# Patient Record
Sex: MALE | NOT HISPANIC OR LATINO | Employment: OTHER | URBAN - METROPOLITAN AREA
[De-identification: names, ages, dates, MRNs, and addresses within clinical notes are randomized per-mention and may not be internally consistent; named-entity substitution may affect disease eponyms.]

---

## 2017-01-01 ENCOUNTER — TELEPHONE (OUTPATIENT)
Dept: INTERNAL MEDICINE | Facility: CLINIC | Age: 77
End: 2017-01-01

## 2017-01-01 ENCOUNTER — OFFICE VISIT (OUTPATIENT)
Dept: INTERNAL MEDICINE | Facility: CLINIC | Age: 77
End: 2017-01-01

## 2017-01-01 VITALS
BODY MASS INDEX: 38.02 KG/M2 | DIASTOLIC BLOOD PRESSURE: 80 MMHG | TEMPERATURE: 98.1 F | HEART RATE: 62 BPM | WEIGHT: 265 LBS | RESPIRATION RATE: 18 BRPM | SYSTOLIC BLOOD PRESSURE: 130 MMHG

## 2017-01-01 VITALS
SYSTOLIC BLOOD PRESSURE: 138 MMHG | HEART RATE: 84 BPM | WEIGHT: 260 LBS | RESPIRATION RATE: 20 BRPM | BODY MASS INDEX: 37.31 KG/M2 | TEMPERATURE: 97.5 F | DIASTOLIC BLOOD PRESSURE: 78 MMHG

## 2017-01-01 DIAGNOSIS — I25.10 CORONARY ARTERY DISEASE INVOLVING NATIVE CORONARY ARTERY OF NATIVE HEART WITHOUT ANGINA PECTORIS: ICD-10-CM

## 2017-01-01 DIAGNOSIS — G47.00 INSOMNIA, UNSPECIFIED TYPE: ICD-10-CM

## 2017-01-01 DIAGNOSIS — E78.5 DYSLIPIDEMIA: ICD-10-CM

## 2017-01-01 DIAGNOSIS — N18.9 CKD (CHRONIC KIDNEY DISEASE), UNSPECIFIED STAGE: Primary | ICD-10-CM

## 2017-01-01 DIAGNOSIS — Z95.1 HX OF CABG: ICD-10-CM

## 2017-01-01 DIAGNOSIS — E11.9 TYPE 2 DIABETES MELLITUS WITHOUT COMPLICATION, WITH LONG-TERM CURRENT USE OF INSULIN (HCC): ICD-10-CM

## 2017-01-01 DIAGNOSIS — K21.9 GASTROESOPHAGEAL REFLUX DISEASE, ESOPHAGITIS PRESENCE NOT SPECIFIED: ICD-10-CM

## 2017-01-01 DIAGNOSIS — I10 ESSENTIAL HYPERTENSION: ICD-10-CM

## 2017-01-01 DIAGNOSIS — E55.9 VITAMIN D DEFICIENCY DISEASE: ICD-10-CM

## 2017-01-01 DIAGNOSIS — N18.9 CHRONIC KIDNEY DISEASE, UNSPECIFIED CKD STAGE: ICD-10-CM

## 2017-01-01 DIAGNOSIS — Z79.4 TYPE 2 DIABETES MELLITUS WITHOUT COMPLICATION, WITH LONG-TERM CURRENT USE OF INSULIN (HCC): ICD-10-CM

## 2017-01-01 DIAGNOSIS — R60.0 LOCALIZED EDEMA: ICD-10-CM

## 2017-01-01 DIAGNOSIS — I25.10 CORONARY ARTERY DISEASE INVOLVING NATIVE CORONARY ARTERY OF NATIVE HEART WITHOUT ANGINA PECTORIS: Primary | ICD-10-CM

## 2017-01-01 LAB
A/C: NORMAL
A/C: NORMAL
ALBUMIN SERPL-MCNC: 4.1 G/DL (ref 3.2–4.8)
ALBUMIN SERPL-MCNC: 4.4 G/DL (ref 3.2–4.8)
ALBUMIN/GLOB SERPL: 1.4 G/DL (ref 1.5–2.5)
ALBUMIN/GLOB SERPL: 1.5 G/DL (ref 1.5–2.5)
ALP SERPL-CCNC: 84 U/L (ref 25–100)
ALP SERPL-CCNC: 99 U/L (ref 25–100)
ALT SERPL-CCNC: 28 U/L (ref 7–40)
ALT SERPL-CCNC: 29 U/L (ref 7–40)
AST SERPL-CCNC: 27 U/L (ref 0–33)
AST SERPL-CCNC: 29 U/L (ref 0–33)
BASOPHILS # BLD AUTO: 0.01 10*3/MM3 (ref 0–0.2)
BASOPHILS # BLD AUTO: 0.02 10*3/MM3 (ref 0–0.2)
BASOPHILS NFR BLD AUTO: 0.2 % (ref 0–1)
BASOPHILS NFR BLD AUTO: 0.4 % (ref 0–1)
BILIRUB BLD-MCNC: NEGATIVE MG/DL
BILIRUB BLD-MCNC: NEGATIVE MG/DL
BILIRUB SERPL-MCNC: 0.4 MG/DL (ref 0.3–1.2)
BILIRUB SERPL-MCNC: 0.4 MG/DL (ref 0.3–1.2)
BUN SERPL-MCNC: 27 MG/DL (ref 9–23)
BUN SERPL-MCNC: 36 MG/DL (ref 9–23)
BUN/CREAT SERPL: 15.9 (ref 7–25)
BUN/CREAT SERPL: 18.9 (ref 7–25)
CALCIUM SERPL-MCNC: 9.2 MG/DL (ref 8.7–10.4)
CALCIUM SERPL-MCNC: 9.4 MG/DL (ref 8.7–10.4)
CHLORIDE SERPL-SCNC: 101 MMOL/L (ref 99–109)
CHLORIDE SERPL-SCNC: 102 MMOL/L (ref 99–109)
CHOLEST SERPL-MCNC: 146 MG/DL (ref 0–200)
CLARITY, POC: CLEAR
CLARITY, POC: CLEAR
CO2 SERPL-SCNC: 25 MMOL/L (ref 20–31)
CO2 SERPL-SCNC: 31 MMOL/L (ref 20–31)
COLOR UR: YELLOW
COLOR UR: YELLOW
CREAT SERPL-MCNC: 1.7 MG/DL (ref 0.6–1.3)
CREAT SERPL-MCNC: 1.9 MG/DL (ref 0.6–1.3)
DIFFERENTIAL COMMENT: NORMAL
DIFFERENTIAL COMMENT: NORMAL
EOSINOPHIL # BLD AUTO: 0.18 10*3/MM3 (ref 0–0.3)
EOSINOPHIL # BLD AUTO: 0.22 10*3/MM3 (ref 0–0.3)
EOSINOPHIL NFR BLD AUTO: 3.2 % (ref 0–3)
EOSINOPHIL NFR BLD AUTO: 3.7 % (ref 0–3)
ERYTHROCYTE [DISTWIDTH] IN BLOOD BY AUTOMATED COUNT: 16 % (ref 11.3–14.5)
ERYTHROCYTE [DISTWIDTH] IN BLOOD BY AUTOMATED COUNT: 19.3 % (ref 11.3–14.5)
EXPIRATION DATE: NORMAL
GFR SERPLBLD CREATININE-BSD FMLA CKD-EPI: 35 ML/MIN/1.73
GFR SERPLBLD CREATININE-BSD FMLA CKD-EPI: 42 ML/MIN/1.73
GLOBULIN SER CALC-MCNC: 2.9 GM/DL
GLOBULIN SER CALC-MCNC: 2.9 GM/DL
GLUCOSE SERPL-MCNC: 179 MG/DL (ref 70–100)
GLUCOSE SERPL-MCNC: 206 MG/DL (ref 70–100)
GLUCOSE UR STRIP-MCNC: NEGATIVE MG/DL
GLUCOSE UR STRIP-MCNC: NEGATIVE MG/DL
H PYLORI IGA SER-ACNC: <9 UNITS (ref 0–8.9)
H PYLORI IGG SER IA-ACNC: 1.3 U/ML (ref 0–0.8)
H PYLORI IGM SER-ACNC: <9 UNITS (ref 0–8.9)
HBA1C MFR BLD: 7.9 %
HBA1C MFR BLD: 8.3 %
HCT VFR BLD AUTO: 36.6 % (ref 38.9–50.9)
HCT VFR BLD AUTO: 38.8 % (ref 38.9–50.9)
HDLC SERPL-MCNC: 32 MG/DL (ref 40–60)
HGB BLD-MCNC: 11.1 G/DL (ref 13.1–17.5)
HGB BLD-MCNC: 12 G/DL (ref 13.1–17.5)
IMM GRANULOCYTES # BLD: 0.01 10*3/MM3 (ref 0–0.03)
IMM GRANULOCYTES # BLD: 0.01 10*3/MM3 (ref 0–0.03)
IMM GRANULOCYTES NFR BLD: 0.2 % (ref 0–0.6)
IMM GRANULOCYTES NFR BLD: 0.2 % (ref 0–0.6)
KETONES UR QL: NEGATIVE
KETONES UR QL: NEGATIVE
LDLC SERPL CALC-MCNC: 57 MG/DL (ref 0–100)
LEUKOCYTE EST, POC: NEGATIVE
LEUKOCYTE EST, POC: NEGATIVE
LYMPHOCYTES # BLD AUTO: 1.72 10*3/MM3 (ref 0.6–4.8)
LYMPHOCYTES # BLD AUTO: 1.81 10*3/MM3 (ref 0.6–4.8)
LYMPHOCYTES NFR BLD AUTO: 28.6 % (ref 24–44)
LYMPHOCYTES NFR BLD AUTO: 32.6 % (ref 24–44)
Lab: NORMAL
MCH RBC QN AUTO: 22.9 PG (ref 27–31)
MCH RBC QN AUTO: 24.2 PG (ref 27–31)
MCHC RBC AUTO-ENTMCNC: 30.3 G/DL (ref 32–36)
MCHC RBC AUTO-ENTMCNC: 30.9 G/DL (ref 32–36)
MCV RBC AUTO: 75.6 FL (ref 80–99)
MCV RBC AUTO: 78.2 FL (ref 80–99)
MONOCYTES # BLD AUTO: 0.42 10*3/MM3 (ref 0–1)
MONOCYTES # BLD AUTO: 0.45 10*3/MM3 (ref 0–1)
MONOCYTES NFR BLD AUTO: 7 % (ref 0–12)
MONOCYTES NFR BLD AUTO: 8.1 % (ref 0–12)
NEUTROPHILS # BLD AUTO: 3.08 10*3/MM3 (ref 1.5–8.3)
NEUTROPHILS # BLD AUTO: 3.63 10*3/MM3 (ref 1.5–8.3)
NEUTROPHILS NFR BLD AUTO: 55.5 % (ref 41–71)
NEUTROPHILS NFR BLD AUTO: 60.3 % (ref 41–71)
NITRITE UR-MCNC: NEGATIVE MG/ML
NITRITE UR-MCNC: NEGATIVE MG/ML
PH UR: 5 [PH] (ref 5–8)
PH UR: 6 [PH] (ref 5–8)
PLATELET # BLD AUTO: 186 10*3/MM3 (ref 150–450)
PLATELET # BLD AUTO: 201 10*3/MM3 (ref 150–450)
PLATELET BLD QL SMEAR: NORMAL
PLATELET BLD QL SMEAR: NORMAL
POC CREATININE URINE: 100
POC CREATININE URINE: 50
POC MICROALBUMIN URINE: 30
POC MICROALBUMIN URINE: 30
POTASSIUM SERPL-SCNC: 4.3 MMOL/L (ref 3.5–5.5)
POTASSIUM SERPL-SCNC: 5.2 MMOL/L (ref 3.5–5.5)
PROT SERPL-MCNC: 7 G/DL (ref 5.7–8.2)
PROT SERPL-MCNC: 7.3 G/DL (ref 5.7–8.2)
PROT UR STRIP-MCNC: NEGATIVE MG/DL
PROT UR STRIP-MCNC: NEGATIVE MG/DL
RBC # BLD AUTO: 4.84 10*6/MM3 (ref 4.2–5.76)
RBC # BLD AUTO: 4.96 10*6/MM3 (ref 4.2–5.76)
RBC # UR STRIP: NEGATIVE /UL
RBC # UR STRIP: NEGATIVE /UL
RBC MORPH BLD: NORMAL
RBC MORPH BLD: NORMAL
SODIUM SERPL-SCNC: 139 MMOL/L (ref 132–146)
SODIUM SERPL-SCNC: 139 MMOL/L (ref 132–146)
SP GR UR: 1.01 (ref 1–1.03)
SP GR UR: 1.01 (ref 1–1.03)
TRIGL SERPL-MCNC: 287 MG/DL (ref 0–150)
TSH SERPL DL<=0.005 MIU/L-ACNC: 0.75 MIU/ML (ref 0.35–5.35)
UROBILINOGEN UR QL: NORMAL
UROBILINOGEN UR QL: NORMAL
VLDLC SERPL CALC-MCNC: 57.4 MG/DL
WBC # BLD AUTO: 5.55 10*3/MM3 (ref 3.5–10.8)
WBC # BLD AUTO: 6.01 10*3/MM3 (ref 3.5–10.8)

## 2017-01-01 PROCEDURE — 81003 URINALYSIS AUTO W/O SCOPE: CPT | Performed by: NURSE PRACTITIONER

## 2017-01-01 PROCEDURE — 99214 OFFICE O/P EST MOD 30 MIN: CPT | Performed by: NURSE PRACTITIONER

## 2017-01-01 PROCEDURE — 36415 COLL VENOUS BLD VENIPUNCTURE: CPT | Performed by: NURSE PRACTITIONER

## 2017-01-01 PROCEDURE — 83036 HEMOGLOBIN GLYCOSYLATED A1C: CPT | Performed by: NURSE PRACTITIONER

## 2017-01-01 PROCEDURE — 82044 UR ALBUMIN SEMIQUANTITATIVE: CPT | Performed by: NURSE PRACTITIONER

## 2017-01-01 RX ORDER — LANSOPRAZOLE 30 MG/1
30 CAPSULE, DELAYED RELEASE ORAL DAILY
Qty: 90 CAPSULE | Refills: 0 | Status: SHIPPED | OUTPATIENT
Start: 2017-01-01

## 2017-01-01 RX ORDER — CLOPIDOGREL BISULFATE 75 MG/1
TABLET ORAL
Qty: 90 TABLET | Refills: 0 | Status: SHIPPED | OUTPATIENT
Start: 2017-01-01 | End: 2017-01-01 | Stop reason: SDUPTHER

## 2017-01-01 RX ORDER — BLOOD SUGAR DIAGNOSTIC, DRUM
STRIP MISCELLANEOUS
Qty: 100 EACH | Refills: 0 | Status: SHIPPED | OUTPATIENT
Start: 2017-01-01 | End: 2017-01-01 | Stop reason: SDUPTHER

## 2017-01-01 RX ORDER — QUETIAPINE FUMARATE 25 MG/1
25 TABLET, FILM COATED ORAL NIGHTLY
Qty: 90 TABLET | Refills: 0 | Status: SHIPPED | OUTPATIENT
Start: 2017-01-01 | End: 2017-01-01 | Stop reason: SDUPTHER

## 2017-01-01 RX ORDER — QUETIAPINE FUMARATE 25 MG/1
TABLET, FILM COATED ORAL
Qty: 90 TABLET | Refills: 1 | Status: SHIPPED | OUTPATIENT
Start: 2017-01-01 | End: 2018-01-01 | Stop reason: SDUPTHER

## 2017-01-01 RX ORDER — CLOPIDOGREL BISULFATE 75 MG/1
75 TABLET ORAL DAILY
Qty: 90 TABLET | Refills: 0 | Status: SHIPPED | OUTPATIENT
Start: 2017-01-01 | End: 2018-01-01 | Stop reason: SDUPTHER

## 2017-01-01 RX ORDER — BENAZEPRIL HYDROCHLORIDE 20 MG/1
1 TABLET ORAL DAILY
COMMUNITY
Start: 2017-01-01

## 2017-01-01 RX ORDER — ASPIRIN 325 MG
TABLET, DELAYED RELEASE (ENTERIC COATED) ORAL
Qty: 90 TABLET | Refills: 1 | Status: SHIPPED | OUTPATIENT
Start: 2017-01-01

## 2017-01-01 RX ORDER — ATORVASTATIN CALCIUM 80 MG/1
TABLET, FILM COATED ORAL
Qty: 30 TABLET | Refills: 0 | Status: SHIPPED | OUTPATIENT
Start: 2017-01-01 | End: 2017-01-01 | Stop reason: SDUPTHER

## 2017-01-01 RX ORDER — ATORVASTATIN CALCIUM 80 MG/1
80 TABLET, FILM COATED ORAL NIGHTLY
Qty: 90 TABLET | Refills: 0 | Status: SHIPPED | OUTPATIENT
Start: 2017-01-01 | End: 2017-01-01 | Stop reason: SDUPTHER

## 2017-01-01 RX ORDER — FUROSEMIDE 40 MG/1
40 TABLET ORAL DAILY
Qty: 90 TABLET | Refills: 0 | Status: SHIPPED | OUTPATIENT
Start: 2017-01-01

## 2017-01-01 RX ORDER — ASPIRIN 325 MG
325 TABLET, DELAYED RELEASE (ENTERIC COATED) ORAL DAILY
Qty: 90 TABLET | Refills: 1 | Status: SHIPPED | OUTPATIENT
Start: 2017-01-01 | End: 2018-01-01

## 2017-01-01 RX ORDER — BLOOD SUGAR DIAGNOSTIC, DRUM
STRIP MISCELLANEOUS
Qty: 100 EACH | Refills: 0 | Status: SHIPPED | OUTPATIENT
Start: 2017-01-01 | End: 2018-01-01 | Stop reason: SDUPTHER

## 2017-01-01 RX ORDER — INSULIN GLARGINE 100 [IU]/ML
INJECTION, SOLUTION SUBCUTANEOUS
Qty: 10 ML | Refills: 0 | Status: SHIPPED | OUTPATIENT
Start: 2017-01-01 | End: 2017-01-01 | Stop reason: SDUPTHER

## 2017-01-01 RX ORDER — INSULIN GLARGINE 100 [IU]/ML
70 INJECTION, SOLUTION SUBCUTANEOUS DAILY
Qty: 10 ML | Refills: 0 | Status: SHIPPED | OUTPATIENT
Start: 2017-01-01 | End: 2018-01-01 | Stop reason: SDUPTHER

## 2017-01-01 RX ORDER — ATORVASTATIN CALCIUM 80 MG/1
TABLET, FILM COATED ORAL
Qty: 90 TABLET | Refills: 0 | Status: SHIPPED | OUTPATIENT
Start: 2017-01-01 | End: 2018-01-01 | Stop reason: SDUPTHER

## 2017-02-06 ENCOUNTER — OFFICE VISIT (OUTPATIENT)
Dept: INTERNAL MEDICINE | Facility: CLINIC | Age: 77
End: 2017-02-06

## 2017-02-06 VITALS
HEIGHT: 68 IN | HEART RATE: 90 BPM | TEMPERATURE: 98.1 F | WEIGHT: 268 LBS | RESPIRATION RATE: 20 BRPM | BODY MASS INDEX: 40.62 KG/M2 | DIASTOLIC BLOOD PRESSURE: 84 MMHG | SYSTOLIC BLOOD PRESSURE: 142 MMHG

## 2017-02-06 DIAGNOSIS — Z79.4 TYPE 2 DIABETES MELLITUS WITHOUT COMPLICATION, WITH LONG-TERM CURRENT USE OF INSULIN (HCC): ICD-10-CM

## 2017-02-06 DIAGNOSIS — Z85.048 HISTORY OF RECTAL OR ANAL CANCER: ICD-10-CM

## 2017-02-06 DIAGNOSIS — K21.9 GASTROESOPHAGEAL REFLUX DISEASE, ESOPHAGITIS PRESENCE NOT SPECIFIED: ICD-10-CM

## 2017-02-06 DIAGNOSIS — Z86.19 HISTORY OF SHINGLES: ICD-10-CM

## 2017-02-06 DIAGNOSIS — E55.9 VITAMIN D DEFICIENCY DISEASE: ICD-10-CM

## 2017-02-06 DIAGNOSIS — E11.9 TYPE 2 DIABETES MELLITUS WITHOUT COMPLICATION, WITH LONG-TERM CURRENT USE OF INSULIN (HCC): ICD-10-CM

## 2017-02-06 DIAGNOSIS — E66.01 MORBID OBESITY, UNSPECIFIED OBESITY TYPE (HCC): ICD-10-CM

## 2017-02-06 DIAGNOSIS — Z00.00 HEALTH CARE MAINTENANCE: ICD-10-CM

## 2017-02-06 DIAGNOSIS — Z95.1 HX OF CABG: ICD-10-CM

## 2017-02-06 DIAGNOSIS — R53.81 PHYSICAL DECONDITIONING: Primary | ICD-10-CM

## 2017-02-06 DIAGNOSIS — I10 ESSENTIAL HYPERTENSION: ICD-10-CM

## 2017-02-06 DIAGNOSIS — R60.0 LOCALIZED EDEMA: ICD-10-CM

## 2017-02-06 LAB
A/C: NORMAL
BILIRUB BLD-MCNC: NEGATIVE MG/DL
CLARITY, POC: CLEAR
COLOR UR: YELLOW
EXPIRATION DATE: ABNORMAL
EXPIRATION DATE: NORMAL
EXPIRATION DATE: NORMAL
GLUCOSE UR STRIP-MCNC: ABNORMAL MG/DL
HBA1C MFR BLD: 7.4 %
KETONES UR QL: NEGATIVE
LEUKOCYTE EST, POC: NEGATIVE
Lab: ABNORMAL
Lab: NORMAL
Lab: NORMAL
NITRITE UR-MCNC: NEGATIVE MG/ML
PH UR: 6 [PH] (ref 5–8)
POC CREATININE URINE: 100
POC MICROALBUMIN URINE: 150
PROT UR STRIP-MCNC: NEGATIVE MG/DL
RBC # UR STRIP: NEGATIVE /UL
SP GR UR: 1.01 (ref 1–1.03)
UROBILINOGEN UR QL: NORMAL

## 2017-02-06 PROCEDURE — 99203 OFFICE O/P NEW LOW 30 MIN: CPT | Performed by: NURSE PRACTITIONER

## 2017-02-06 PROCEDURE — 83036 HEMOGLOBIN GLYCOSYLATED A1C: CPT | Performed by: NURSE PRACTITIONER

## 2017-02-06 PROCEDURE — 82044 UR ALBUMIN SEMIQUANTITATIVE: CPT | Performed by: NURSE PRACTITIONER

## 2017-02-06 PROCEDURE — 81003 URINALYSIS AUTO W/O SCOPE: CPT | Performed by: NURSE PRACTITIONER

## 2017-02-06 RX ORDER — BLOOD SUGAR DIAGNOSTIC, DRUM
STRIP MISCELLANEOUS
COMMUNITY
Start: 2016-11-16 | End: 2017-01-01 | Stop reason: SDUPTHER

## 2017-02-06 RX ORDER — DILTIAZEM HYDROCHLORIDE 240 MG/1
240 CAPSULE, EXTENDED RELEASE ORAL DAILY
Qty: 90 CAPSULE | Refills: 1 | Status: SHIPPED | OUTPATIENT
Start: 2017-02-06 | End: 2017-06-06 | Stop reason: SDUPTHER

## 2017-02-06 RX ORDER — HYDROCHLOROTHIAZIDE 25 MG/1
25 TABLET ORAL DAILY
Qty: 90 TABLET | Refills: 1 | Status: SHIPPED | OUTPATIENT
Start: 2017-02-06 | End: 2017-06-06

## 2017-02-06 RX ORDER — DILTIAZEM HYDROCHLORIDE 240 MG/1
240 CAPSULE, EXTENDED RELEASE ORAL DAILY
COMMUNITY
Start: 2017-01-06 | End: 2017-02-06 | Stop reason: SDUPTHER

## 2017-02-06 RX ORDER — BENAZEPRIL HYDROCHLORIDE 20 MG/1
20 TABLET ORAL DAILY
Qty: 90 TABLET | Refills: 1 | Status: SHIPPED | OUTPATIENT
Start: 2017-02-06 | End: 2017-06-06 | Stop reason: SDUPTHER

## 2017-02-06 RX ORDER — CLOPIDOGREL BISULFATE 75 MG/1
75 TABLET ORAL DAILY
Qty: 90 TABLET | Refills: 1 | Status: SHIPPED | OUTPATIENT
Start: 2017-02-06 | End: 2017-06-06 | Stop reason: SDUPTHER

## 2017-02-06 RX ORDER — BENAZEPRIL HYDROCHLORIDE 20 MG/1
20 TABLET ORAL DAILY
COMMUNITY
Start: 2017-01-14 | End: 2017-02-06 | Stop reason: SDUPTHER

## 2017-02-06 RX ORDER — CLOPIDOGREL BISULFATE 75 MG/1
75 TABLET ORAL DAILY
COMMUNITY
Start: 2017-01-07 | End: 2017-02-06 | Stop reason: SDUPTHER

## 2017-02-06 NOTE — PATIENT INSTRUCTIONS
Edema  Edema is an abnormal buildup of fluids in your body tissues. Edema is somewhat dependent on gravity to pull the fluid to the lowest place in your body. That makes the condition more common in the legs and thighs (lower extremities). Painless swelling of the feet and ankles is common and becomes more likely as you get older. It is also common in looser tissues, like around your eyes.   When the affected area is squeezed, the fluid may move out of that spot and leave a dent for a few moments. This dent is called pitting.   CAUSES   There are many possible causes of edema. Eating too much salt and being on your feet or sitting for a long time can cause edema in your legs and ankles. Hot weather may make edema worse. Common medical causes of edema include:  · Heart failure.  · Liver disease.  · Kidney disease.  · Weak blood vessels in your legs.  · Cancer.  · An injury.  · Pregnancy.  · Some medications.  · Obesity.   SYMPTOMS   Edema is usually painless. Your skin may look swollen or shiny.   DIAGNOSIS   Your health care provider may be able to diagnose edema by asking about your medical history and doing a physical exam. You may need to have tests such as X-rays, an electrocardiogram, or blood tests to check for medical conditions that may cause edema.   TREATMENT   Edema treatment depends on the cause. If you have heart, liver, or kidney disease, you need the treatment appropriate for these conditions. General treatment may include:  · Elevation of the affected body part above the level of your heart.  · Compression of the affected body part. Pressure from elastic bandages or support stockings squeezes the tissues and forces fluid back into the blood vessels. This keeps fluid from entering the tissues.  · Restriction of fluid and salt intake.  · Use of a water pill (diuretic). These medications are appropriate only for some types of edema. They pull fluid out of your body and make you urinate more often. This  gets rid of fluid and reduces swelling, but diuretics can have side effects. Only use diuretics as directed by your health care provider.  HOME CARE INSTRUCTIONS   · Keep the affected body part above the level of your heart when you are lying down.    · Do not sit still or stand for prolonged periods.    · Do not put anything directly under your knees when lying down.  · Do not wear constricting clothing or garters on your upper legs.    · Exercise your legs to work the fluid back into your blood vessels. This may help the swelling go down.    · Wear elastic bandages or support stockings to reduce ankle swelling as directed by your health care provider.    · Eat a low-salt diet to reduce fluid if your health care provider recommends it.    · Only take medicines as directed by your health care provider.   SEEK MEDICAL CARE IF:   · Your edema is not responding to treatment.  · You have heart, liver, or kidney disease and notice symptoms of edema.  · You have edema in your legs that does not improve after elevating them.    · You have sudden and unexplained weight gain.  SEEK IMMEDIATE MEDICAL CARE IF:   · You develop shortness of breath or chest pain.    · You cannot breathe when you lie down.  · You develop pain, redness, or warmth in the swollen areas.    · You have heart, liver, or kidney disease and suddenly get edema.  · You have a fever and your symptoms suddenly get worse.  MAKE SURE YOU:   · Understand these instructions.  · Will watch your condition.  · Will get help right away if you are not doing well or get worse.     This information is not intended to replace advice given to you by your health care provider. Make sure you discuss any questions you have with your health care provider.     Document Released: 12/18/2006 Document Revised: 01/08/2016 Document Reviewed: 10/10/2014  [employer] Interactive Patient Education ©2016 [employer]

## 2017-02-06 NOTE — PROGRESS NOTES
Chief Complaint   Patient presents with   • Foot Pain        Subjective     History of Present Illness    The patient is here today to establish care.  He is with his wife.  He reports they have lived here since the 1970s history and the [place] in [place].  They presently live at [facility].  His prior physician was Dr. Whitney.    Patient has past medical history of congestive heart failure, diabetes, hypertension, rectal cancer, shingles, he has had a history of heart bypass times 3/19/98 knee surgery right total knee .  Current medications are listed and reviewed with patient.    Allergies to penicillin causes unknown reaction he was on when it occurred    Social history includes retired prior restaurant , .  Previous smoker ×40 years 2-3 packs per day cessation was in .  Alcohol use is none history of tattoos.  Family medical history history of heart disease father  with MI at 53 mother with Alzheimer's in her 80s questionable diabetes history.        The following portions of the patient's history were reviewed and updated as appropriate: allergies, current medications, past family history, past medical history, past social history, past surgical history and problem list.    Review of Systems   Cardiovascular: Positive for leg swelling.   All other systems reviewed and are negative.    Worse edema in [place] and wife started therpay for him.   Objective   Physical Exam   Constitutional: He is oriented to person, place, and time. He appears well-developed and well-nourished.   HENT:   Head: Normocephalic and atraumatic.   Right Ear: External ear normal.   Left Ear: External ear normal.   Nose: Nose normal.   Mouth/Throat: Oropharynx is clear and moist.   Eyes: Conjunctivae are normal. No scleral icterus.   Neck: Normal range of motion. Neck supple. No thyromegaly present.   Cardiovascular: Normal rate, regular rhythm, normal heart sounds and intact distal pulses.     Pulmonary/Chest: Effort normal and breath sounds normal.   Abdominal: Soft. Bowel sounds are normal. He exhibits no distension and no mass. There is no tenderness. There is no rebound and no guarding. No hernia.   Musculoskeletal: Normal range of motion.   Lymphadenopathy:     He has no cervical adenopathy.   Neurological: He is alert and oriented to person, place, and time. He has normal reflexes.   Skin: Skin is warm and dry.   Psychiatric: He has a normal mood and affect. His behavior is normal.   Nursing note and vitals reviewed.        Current Outpatient Prescriptions:   •  ACCU-CHEK COMPACT PLUS test strip, , Disp: , Rfl:   •  benazepril (LOTENSIN) 20 MG tablet, Take 1 tablet by mouth Daily., Disp: 90 tablet, Rfl: 1  •  clopidogrel (PLAVIX) 75 MG tablet, Take 1 tablet by mouth Daily., Disp: 90 tablet, Rfl: 1  •  hydrochlorothiazide (HYDRODIURIL) 25 MG tablet, Take 1 tablet by mouth Daily., Disp: 90 tablet, Rfl: 1  •  Multiple Vitamins-Minerals (CENTRUM SILVER ADULT 50+ PO), Take  by mouth Daily., Disp: , Rfl:   •  TAZTIA  MG 24 hr capsule, Take 1 capsule by mouth Daily., Disp: 90 capsule, Rfl: 1  •  VITAMIN D, ERGOCALCIFEROL, PO, Take  by mouth Daily., Disp: , Rfl:     noother herb vit supplement    Results for orders placed or performed in visit on 02/06/17   TSH   Result Value Ref Range    TSH 0.273 (L) 0.450 - 4.500 uIU/mL   Comprehensive Metabolic Panel   Result Value Ref Range    Glucose 113 (H) 65 - 99 mg/dL    BUN 27 8 - 27 mg/dL    Creatinine 2.00 (H) 0.76 - 1.27 mg/dL    eGFR Non African Am 31 (L) >59 mL/min/1.73    eGFR  Am 36 (L) >59 mL/min/1.73    BUN/Creatinine Ratio 14 10 - 22    Sodium 140 134 - 144 mmol/L    Potassium 5.3 (H) 3.5 - 5.2 mmol/L    Chloride 103 96 - 106 mmol/L    Total CO2 23 18 - 29 mmol/L    Calcium 9.3 8.6 - 10.2 mg/dL    Total Protein 6.9 6.0 - 8.5 g/dL    Albumin 4.5 3.5 - 4.8 g/dL    Globulin 2.4 1.5 - 4.5 g/dL    A/G Ratio 1.9 1.1 - 2.5    Total Bilirubin 0.3  0.0 - 1.2 mg/dL    Alkaline Phosphatase 43 39 - 117 IU/L    AST (SGOT) 26 0 - 40 IU/L    ALT (SGPT) 18 0 - 44 IU/L   Lipid Panel   Result Value Ref Range    Total Cholesterol 159 100 - 199 mg/dL    Triglycerides 144 0 - 149 mg/dL    HDL Cholesterol 44 >39 mg/dL    VLDL Cholesterol 29 5 - 40 mg/dL    LDL Cholesterol  86 0 - 99 mg/dL   Vitamin D 25 Hydroxy   Result Value Ref Range    25 Hydroxy, Vitamin D 39.5 30.0 - 100.0 ng/mL   PSA   Result Value Ref Range    PSA 0.7 0.0 - 4.0 ng/mL   POC Urinalysis Dipstick, Automated   Result Value Ref Range    Color Yellow Yellow, Straw, Dark Yellow, Gaby    Clarity, UA Clear Clear    Glucose,  mg/dL (A) Negative, 1000 mg/dL (3+) mg/dL    Bilirubin Negative Negative    Ketones, UA Negative Negative    Specific Gravity  1.010 1.005 - 1.030    Blood, UA Negative Negative    pH, Urine 6.0 5.0 - 8.0    Protein, POC Negative Negative mg/dL    Urobilinogen, UA Normal Normal    Leukocytes Negative Negative    Nitrite, UA Negative Negative    Lot Number [id number]     Expiration Date 9-17    POC Microalbumin   Result Value Ref Range    Microalbumin, Urine 150     Creatinine, Urine 100     A/C >300mg/g HIGH ABNORMAL     Lot Number [id number]     Expiration Date 11-17    POC Glycosylated Hemoglobin (Hb A1C)   Result Value Ref Range    Hemoglobin A1C 7.4 %    Lot Number [id number]     Expiration Date 9-18    CBC & Differential   Result Value Ref Range    WBC 5.6 3.4 - 10.8 x10E3/uL    RBC 4.87 4.14 - 5.80 x10E6/uL    Hemoglobin 13.3 12.6 - 17.7 g/dL    Hematocrit 39.8 37.5 - 51.0 %    MCV 82 79 - 97 fL    MCH 27.3 26.6 - 33.0 pg    MCHC 33.4 31.5 - 35.7 g/dL    RDW 14.5 12.3 - 15.4 %    Platelets 201 150 - 379 x10E3/uL    Neutrophil Rel % 57 %    Lymphocyte Rel % 32 %    Monocyte Rel % 8 %    Eosinophil Rel % 3 %    Basophil Rel % 0 %    Neutrophils Absolute 3.2 1.4 - 7.0 x10E3/uL    Lymphocytes Absolute 1.8 0.7 - 3.1 x10E3/uL    Monocytes Absolute 0.5 0.1 - 0.9 x10E3/uL    Eosinophils  Absolute 0.2 0.0 - 0.4 x10E3/uL    Basophils Absolute 0.0 0.0 - 0.2 x10E3/uL    Immature Granulocyte Rel % 0 %    Immature Grans Absolute 0.0 0.0 - 0.1 x10E3/uL        Assessment/Plan   Damaso was seen today for foot pain.    Diagnoses and all orders for this visit:    Physical deconditioning  -     Ambulatory Referral to Physical Therapy Evaluate and treat    Type 2 diabetes mellitus without complication, with long-term current use of insulin  -     POC Microalbumin  -     POC Glycosylated Hemoglobin (Hb A1C)    Gastroesophageal reflux disease, esophagitis presence not specified  -     CBC & Differential    Essential hypertension  -     benazepril (LOTENSIN) 20 MG tablet; Take 1 tablet by mouth Daily.  -     TAZTIA  MG 24 hr capsule; Take 1 capsule by mouth Daily.  -     TSH  -     Comprehensive Metabolic Panel  -     POC Urinalysis Dipstick, Automated  -     hydrochlorothiazide (HYDRODIURIL) 25 MG tablet; Take 1 tablet by mouth Daily.    History of shingles    History of rectal or anal cancer    Hx of CABG  -     clopidogrel (PLAVIX) 75 MG tablet; Take 1 tablet by mouth Daily.    Vitamin D deficiency disease  -     Vitamin D 25 Hydroxy    Morbid obesity, unspecified obesity type  -     Lipid Panel    Health care maintenance  -     PSA    Localized edema     patient low-salt diet, will obtain old records from his prior PCP.  Reviewed with him the risk of not completing preventative tests that his colonoscopy.  He will consider.  Approximately 40 minutes was spent with patient approximately 20 minutes of that time was spent with patient in regards to edema low-salt diet.  Patient verbalizes understanding.  Health maintenance includes prostate exam 2010 colonoscopy 2009 he reports there was a polyp and he was started to go back for repeat colonoscopy however he went on the wrong day and refuses to go back      immunizations unknown     Follow upfollow-up in 3 weeks fasting and for annual wellness  call  If  symptoms worsen  Meds MOA and SE's reviewed and pt v/u

## 2017-02-07 DIAGNOSIS — R79.89 ABNORMAL TSH: Primary | ICD-10-CM

## 2017-02-07 DIAGNOSIS — R79.89 LOW TSH LEVEL: ICD-10-CM

## 2017-02-07 LAB
25(OH)D3+25(OH)D2 SERPL-MCNC: 39.5 NG/ML (ref 30–100)
ALBUMIN SERPL-MCNC: 4.5 G/DL (ref 3.5–4.8)
ALBUMIN/GLOB SERPL: 1.9 {RATIO} (ref 1.1–2.5)
ALP SERPL-CCNC: 43 IU/L (ref 39–117)
ALT SERPL-CCNC: 18 IU/L (ref 0–44)
AST SERPL-CCNC: 26 IU/L (ref 0–40)
BASOPHILS # BLD AUTO: 0 X10E3/UL (ref 0–0.2)
BASOPHILS NFR BLD AUTO: 0 %
BILIRUB SERPL-MCNC: 0.3 MG/DL (ref 0–1.2)
BUN SERPL-MCNC: 27 MG/DL (ref 8–27)
BUN/CREAT SERPL: 14 (ref 10–22)
CALCIUM SERPL-MCNC: 9.3 MG/DL (ref 8.6–10.2)
CHLORIDE SERPL-SCNC: 103 MMOL/L (ref 96–106)
CHOLEST SERPL-MCNC: 159 MG/DL (ref 100–199)
CO2 SERPL-SCNC: 23 MMOL/L (ref 18–29)
CREAT SERPL-MCNC: 2 MG/DL (ref 0.76–1.27)
EOSINOPHIL # BLD AUTO: 0.2 X10E3/UL (ref 0–0.4)
EOSINOPHIL NFR BLD AUTO: 3 %
ERYTHROCYTE [DISTWIDTH] IN BLOOD BY AUTOMATED COUNT: 14.5 % (ref 12.3–15.4)
GLOBULIN SER CALC-MCNC: 2.4 G/DL (ref 1.5–4.5)
GLUCOSE SERPL-MCNC: 113 MG/DL (ref 65–99)
HCT VFR BLD AUTO: 39.8 % (ref 37.5–51)
HDLC SERPL-MCNC: 44 MG/DL
HGB BLD-MCNC: 13.3 G/DL (ref 12.6–17.7)
IMM GRANULOCYTES # BLD: 0 X10E3/UL (ref 0–0.1)
IMM GRANULOCYTES NFR BLD: 0 %
LDLC SERPL CALC-MCNC: 86 MG/DL (ref 0–99)
LYMPHOCYTES # BLD AUTO: 1.8 X10E3/UL (ref 0.7–3.1)
LYMPHOCYTES NFR BLD AUTO: 32 %
MCH RBC QN AUTO: 27.3 PG (ref 26.6–33)
MCHC RBC AUTO-ENTMCNC: 33.4 G/DL (ref 31.5–35.7)
MCV RBC AUTO: 82 FL (ref 79–97)
MONOCYTES # BLD AUTO: 0.5 X10E3/UL (ref 0.1–0.9)
MONOCYTES NFR BLD AUTO: 8 %
NEUTROPHILS # BLD AUTO: 3.2 X10E3/UL (ref 1.4–7)
NEUTROPHILS NFR BLD AUTO: 57 %
PLATELET # BLD AUTO: 201 X10E3/UL (ref 150–379)
POTASSIUM SERPL-SCNC: 5.3 MMOL/L (ref 3.5–5.2)
PROT SERPL-MCNC: 6.9 G/DL (ref 6–8.5)
PSA SERPL-MCNC: 0.7 NG/ML (ref 0–4)
RBC # BLD AUTO: 4.87 X10E6/UL (ref 4.14–5.8)
SODIUM SERPL-SCNC: 140 MMOL/L (ref 134–144)
TRIGL SERPL-MCNC: 144 MG/DL (ref 0–149)
TSH SERPL DL<=0.005 MIU/L-ACNC: 0.27 UIU/ML (ref 0.45–4.5)
VLDLC SERPL CALC-MCNC: 29 MG/DL (ref 5–40)
WBC # BLD AUTO: 5.6 X10E3/UL (ref 3.4–10.8)

## 2017-02-08 ENCOUNTER — LAB (OUTPATIENT)
Dept: INTERNAL MEDICINE | Facility: CLINIC | Age: 77
End: 2017-02-08

## 2017-02-08 DIAGNOSIS — R79.89 LOW TSH LEVEL: Primary | ICD-10-CM

## 2017-02-08 PROCEDURE — 36415 COLL VENOUS BLD VENIPUNCTURE: CPT | Performed by: NURSE PRACTITIONER

## 2017-02-09 ENCOUNTER — TELEPHONE (OUTPATIENT)
Dept: INTERNAL MEDICINE | Facility: CLINIC | Age: 77
End: 2017-02-09

## 2017-02-09 LAB
T3FREE SERPL-MCNC: 2.7 PG/ML (ref 2–4.4)
T4 FREE SERPL-MCNC: 0.99 NG/DL (ref 0.82–1.77)
TSH SERPL DL<=0.005 MIU/L-ACNC: 0.49 UIU/ML (ref 0.45–4.5)

## 2017-02-09 NOTE — TELEPHONE ENCOUNTER
----- Message from Priya Radford sent at 2/9/2017  1:17 PM EST -----  Patient was wanting a prescription for lortab 10mg. He was a new patient to [facility] on the 6th, but he said the doctors usually prescribed this for him.    [facility] pharmacy on [facility]

## 2017-02-09 NOTE — TELEPHONE ENCOUNTER
Spoke with pt and he stated that he is needing Rx Ibuprofen not Lortab. Can you please send in Rx Ibuprofen?

## 2017-02-09 NOTE — TELEPHONE ENCOUNTER
Tried calling pt to see what type of symptoms he is having to ask for Rx Lortab. Will this be okay to fill?

## 2017-02-10 NOTE — TELEPHONE ENCOUNTER
Please see how patient is wanting medicine for as far as pain control.  Also anti-inflammatories such as Motrin ibuprofen Advil or Aleve are not healthy for his kidneys and should be avoided in general.  Please let me know about his pain so we can develop a plan.

## 2017-03-30 RX ORDER — INSULIN GLARGINE 100 [IU]/ML
100 INJECTION, SOLUTION SUBCUTANEOUS DAILY
Qty: 10 ML | Refills: 1 | Status: SHIPPED | OUTPATIENT
Start: 2017-03-30 | End: 2017-06-08 | Stop reason: SDUPTHER

## 2017-03-30 RX ORDER — INSULIN GLARGINE 100 [IU]/ML
INJECTION, SOLUTION SUBCUTANEOUS
COMMUNITY
Start: 2017-02-22 | End: 2017-03-30 | Stop reason: SDUPTHER

## 2017-03-30 NOTE — TELEPHONE ENCOUNTER
----- Message from Becca Hamm sent at 3/30/2017  2:24 PM EDT -----  Pt called needing refill on rx lantus.   He can be reached at [phone]    Pharmacy- [facility]

## 2017-03-31 ENCOUNTER — TELEPHONE (OUTPATIENT)
Dept: INTERNAL MEDICINE | Facility: CLINIC | Age: 77
End: 2017-03-31

## 2017-03-31 NOTE — TELEPHONE ENCOUNTER
----- Message from Becca Hamm sent at 3/31/2017 10:36 AM EDT -----  [facility] CALLED NEEDING CLARIFICATION ON RX LANCETS. HE WANTED TO KNOW IF PT CAN HAVE TWO BOTTLES INSTEAD OF ONE. HE CAN BE REACHED -[phone]

## 2017-04-11 ENCOUNTER — TELEPHONE (OUTPATIENT)
Dept: INTERNAL MEDICINE | Facility: CLINIC | Age: 77
End: 2017-04-11

## 2017-04-11 ENCOUNTER — OFFICE VISIT (OUTPATIENT)
Dept: INTERNAL MEDICINE | Facility: CLINIC | Age: 77
End: 2017-04-11

## 2017-04-11 VITALS
BODY MASS INDEX: 40.6 KG/M2 | HEART RATE: 90 BPM | DIASTOLIC BLOOD PRESSURE: 68 MMHG | WEIGHT: 267 LBS | RESPIRATION RATE: 20 BRPM | SYSTOLIC BLOOD PRESSURE: 140 MMHG | TEMPERATURE: 97.6 F

## 2017-04-11 DIAGNOSIS — R53.81 PHYSICAL DECONDITIONING: ICD-10-CM

## 2017-04-11 DIAGNOSIS — E11.9 TYPE 2 DIABETES MELLITUS WITHOUT COMPLICATION, WITH LONG-TERM CURRENT USE OF INSULIN (HCC): ICD-10-CM

## 2017-04-11 DIAGNOSIS — K21.9 GASTROESOPHAGEAL REFLUX DISEASE, ESOPHAGITIS PRESENCE NOT SPECIFIED: ICD-10-CM

## 2017-04-11 DIAGNOSIS — Z79.4 TYPE 2 DIABETES MELLITUS WITHOUT COMPLICATION, WITH LONG-TERM CURRENT USE OF INSULIN (HCC): ICD-10-CM

## 2017-04-11 DIAGNOSIS — Z85.048 HISTORY OF RECTAL OR ANAL CANCER: ICD-10-CM

## 2017-04-11 DIAGNOSIS — Z95.1 HX OF CABG: ICD-10-CM

## 2017-04-11 DIAGNOSIS — R79.89 LOW TSH LEVEL: ICD-10-CM

## 2017-04-11 DIAGNOSIS — Z79.899 HIGH RISK MEDICATION USE: ICD-10-CM

## 2017-04-11 DIAGNOSIS — E87.5 HYPERKALEMIA: ICD-10-CM

## 2017-04-11 DIAGNOSIS — I10 ESSENTIAL HYPERTENSION: ICD-10-CM

## 2017-04-11 DIAGNOSIS — R60.9 EDEMA, UNSPECIFIED TYPE: ICD-10-CM

## 2017-04-11 DIAGNOSIS — E55.9 VITAMIN D DEFICIENCY DISEASE: Primary | ICD-10-CM

## 2017-04-11 DIAGNOSIS — I25.10 CORONARY ARTERY DISEASE INVOLVING NATIVE HEART WITHOUT ANGINA PECTORIS, UNSPECIFIED VESSEL OR LESION TYPE: ICD-10-CM

## 2017-04-11 DIAGNOSIS — R79.89 ABNORMAL TSH: ICD-10-CM

## 2017-04-11 DIAGNOSIS — Z86.19 HISTORY OF SHINGLES: ICD-10-CM

## 2017-04-11 DIAGNOSIS — E66.01 MORBID OBESITY, UNSPECIFIED OBESITY TYPE (HCC): ICD-10-CM

## 2017-04-11 DIAGNOSIS — R53.83 FATIGUE, UNSPECIFIED TYPE: ICD-10-CM

## 2017-04-11 LAB
ALBUMIN SERPL-MCNC: 4.4 G/DL (ref 3.2–4.8)
ALBUMIN/GLOB SERPL: 1.5 G/DL (ref 1.5–2.5)
ALP SERPL-CCNC: 50 U/L (ref 25–100)
ALT SERPL-CCNC: 24 U/L (ref 7–40)
AST SERPL-CCNC: 27 U/L (ref 0–33)
BILIRUB SERPL-MCNC: 0.3 MG/DL (ref 0.3–1.2)
BUN SERPL-MCNC: 44 MG/DL (ref 9–23)
BUN/CREAT SERPL: 18.3 (ref 7–25)
CALCIUM SERPL-MCNC: 10.3 MG/DL (ref 8.7–10.4)
CHLORIDE SERPL-SCNC: 107 MMOL/L (ref 99–109)
CO2 SERPL-SCNC: 26 MMOL/L (ref 20–31)
CREAT SERPL-MCNC: 2.4 MG/DL (ref 0.6–1.3)
GLOBULIN SER CALC-MCNC: 3 GM/DL
GLUCOSE SERPL-MCNC: 134 MG/DL (ref 70–100)
POTASSIUM SERPL-SCNC: 5.1 MMOL/L (ref 3.5–5.5)
PROT SERPL-MCNC: 7.4 G/DL (ref 5.7–8.2)
SODIUM SERPL-SCNC: 140 MMOL/L (ref 132–146)
TSH SERPL DL<=0.005 MIU/L-ACNC: 0.29 MIU/ML (ref 0.35–5.35)

## 2017-04-11 PROCEDURE — 36415 COLL VENOUS BLD VENIPUNCTURE: CPT | Performed by: NURSE PRACTITIONER

## 2017-04-11 PROCEDURE — 99214 OFFICE O/P EST MOD 30 MIN: CPT | Performed by: NURSE PRACTITIONER

## 2017-04-11 RX ORDER — FUROSEMIDE 20 MG/1
TABLET ORAL
Qty: 30 TABLET | Refills: 0 | Status: SHIPPED | OUTPATIENT
Start: 2017-04-11 | End: 2017-06-06

## 2017-04-11 RX ORDER — POTASSIUM CHLORIDE 750 MG/1
10 TABLET, EXTENDED RELEASE ORAL DAILY PRN
Qty: 30 TABLET | Refills: 0 | Status: SHIPPED | OUTPATIENT
Start: 2017-04-11 | End: 2017-06-06 | Stop reason: SDUPTHER

## 2017-04-11 NOTE — PROGRESS NOTES
Chief Complaint   Patient presents with   • Fatigue   • Knee Pain        Subjective     History of Present Illness   The pt is here today for f/u. He established care in . Without wife today.    He feels fatigued.  Does not want to get up. Especially tired going up stairs.  Symptoms has been for 2-3 weeks. PFH dad  at MI 53yo  psh TA 2-3 ppd 40yrs.  He has had cardiology in past unsure who he saw. CABG . Had heart cath he thinks 4-5 yr ago.    Lortab prescribed by orthopedics too much for him for his knee pain.  He used motrin low dose not helping but request 800mg. he has had right total knee repair in the past.  He has had multiple injections in his left knee but refuses total knee.    He did muscle strengthening at [facility] it did help however he did not continue his exercises at home.      Down 1 # in weight    No cp sob edema heartburn only prn with mexican.  Continues to have bilateral lower extremity swelling.    The following portions of the patient's history were reviewed and updated as appropriate: allergies, current medications, past family history, past medical history, past social history, past surgical history and problem list.    Review of Systems   Constitutional: Positive for activity change. Negative for appetite change, fever and unexpected weight change.   Respiratory: Negative for shortness of breath.    Neurological: Negative for dizziness and headaches.   All other systems reviewed and are negative.      Objective   Physical Exam   Constitutional: He is oriented to person, place, and time. He appears well-developed and well-nourished.   Eyes: Conjunctivae are normal. Pupils are equal, round, and reactive to light.   Neck: No JVD present.   Cardiovascular: Normal rate, regular rhythm, normal heart sounds and intact distal pulses.  Exam reveals no gallop and no friction rub.    No murmur heard.  Bilateral lower extremities edema 2+ mid calf to feet.     Pulmonary/Chest: Effort  normal and breath sounds normal.   Abdominal: Soft. Bowel sounds are normal.   Neurological: He is alert and oriented to person, place, and time.   Skin: Skin is warm and dry.   Psychiatric: He has a normal mood and affect. His behavior is normal.   Nursing note and vitals reviewed.      Results for orders placed or performed in visit on 02/08/17   TSH   Result Value Ref Range    TSH 0.492 0.450 - 4.500 uIU/mL   T4, Free   Result Value Ref Range    Free T4 0.99 0.82 - 1.77 ng/dL   T3, Free   Result Value Ref Range    T3, Free 2.7 2.0 - 4.4 pg/mL        Assessment/Plan   Damaso was seen today for fatigue and knee pain.    Diagnoses and all orders for this visit:    Vitamin D deficiency disease    History of shingles    Type 2 diabetes mellitus without complication, with long-term current use of insulin    Low TSH level  -     TSH    Abnormal TSH    Gastroesophageal reflux disease, esophagitis presence not specified    Morbid obesity, unspecified obesity type    History of rectal or anal cancer  -     Ambulatory Referral to Gastroenterology    Hx of CABG  -     Ambulatory Referral to Cardiology    Physical deconditioning    Essential hypertension    Fatigue, unspecified type    Hyperkalemia  -     Comprehensive Metabolic Panel    High risk medication use  -     Cancel: Urine Drug Screen    Edema, unspecified type  -     furosemide (LASIX) 20 MG tablet; One half tablet daily when necessary edema.  Initially one half tablet daily 4 days area take with potassium.  -     potassium chloride (K-DUR,KLOR-CON) 10 MEQ CR tablet; Take 1 tablet by mouth Daily As Needed (Take potassium only the days he takes Lasix.  Initially 1 tablet of potassium daily  for 4 days).    Coronary artery disease involving native heart without angina pectoris, unspecified vessel or lesion type  -     Ambulatory Referral to Cardiology    TSh abnml then reheck nml will recheck today.  hyperkalemia-recheck cmp today  Declines colonoscopy risk of not  checking reviewed including risk of colon/risk cancer. Agrees to consult with colorectal will set up.  Cardio consult.   last vit d level stable.  Reviewed all labs with patient today in office.  Cont to enc dash diet  We'll add Lasix 10 mg daily ×4 days and recheck in clinic on Friday with BMP area and reassess blood pressure at that time.  Continue low-salt diet.  Continue with foot of bed elevated as much as possible.  Consider Jobst compression stockings.    HB 1 today  csa uds  jhonny   Possible ultram trial tylneol regular strength with 200 mg Motrin as directed ibuprfen first will call if pain not improving.     Follow up 1mo  RTC/call  If symptoms worsen  Meds MOA and SE's reviewed and pt v/u

## 2017-04-11 NOTE — PATIENT INSTRUCTIONS
Knee Pain  Knee pain is a very common symptom and can have many causes. Knee pain often goes away when you follow your health care provider's instructions for relieving pain and discomfort at home. However, knee pain can develop into a condition that needs treatment. Some conditions may include:  · Arthritis caused by wear and tear (osteoarthritis).  · Arthritis caused by swelling and irritation (rheumatoid arthritis or gout).  · A cyst or growth in your knee.  · An infection in your knee joint.  · An injury that will not heal.  · Damage, swelling, or irritation of the tissues that support your knee (torn ligaments or tendinitis).  If your knee pain continues, additional tests may be ordered to diagnose your condition. Tests may include X-rays or other imaging studies of your knee. You may also need to have fluid removed from your knee. Treatment for ongoing knee pain depends on the cause, but treatment may include:  · Medicines to relieve pain or swelling.  · Steroid injections in your knee.  · Physical therapy.  · Surgery.  HOME CARE INSTRUCTIONS  · Take medicines only as directed by your health care provider.  · Rest your knee and keep it raised (elevated) while you are resting.  · Do not do things that cause or worsen pain.  · Avoid high-impact activities or exercises, such as running, jumping rope, or doing jumping jacks.  · Apply ice to the knee area:    Put ice in a plastic bag.    Place a towel between your skin and the bag.    Leave the ice on for 20 minutes, 2-3 times a day.  · Ask your health care provider if you should wear an elastic knee support.  · Keep a pillow under your knee when you sleep.  · Lose weight if you are overweight. Extra weight can put pressure on your knee.  · Do not use any tobacco products, including cigarettes, chewing tobacco, or electronic cigarettes. If you need help quitting, ask your health care provider. Smoking may slow the healing of any bone and joint problems that you may  "have.  SEEK MEDICAL CARE IF:  · Your knee pain continues, changes, or gets worse.  · You have a fever along with knee pain.  · Your knee rebekah or locks up.  · Your knee becomes more swollen.  SEEK IMMEDIATE MEDICAL CARE IF:   · Your knee joint feels hot to the touch.  · You have chest pain or trouble breathing.     This information is not intended to replace advice given to you by your health care provider. Make sure you discuss any questions you have with your health care provider.     Document Released: 10/14/2008 Document Revised: 01/08/2016 Document Reviewed: 08/03/2015  [employer] Interactive Patient Education ©2016 [employer] Inc.  DASH Eating Plan  DASH stands for \"Dietary Approaches to Stop Hypertension.\" The DASH eating plan is a healthy eating plan that has been shown to reduce high blood pressure (hypertension). Additional health benefits may include reducing the risk of type 2 diabetes mellitus, heart disease, and stroke. The DASH eating plan may also help with weight loss.  WHAT DO I NEED TO KNOW ABOUT THE DASH EATING PLAN?  For the DASH eating plan, you will follow these general guidelines:  Choose foods with a percent daily value for sodium of less than 5% (as listed on the food label).  Use salt-free seasonings or herbs instead of table salt or sea salt.  Check with your health care provider or pharmacist before using salt substitutes.  Eat lower-sodium products, often labeled as \"lower sodium\" or \"no salt added.\"  Eat fresh foods.  Eat more vegetables, fruits, and low-fat dairy products.  Choose whole grains. Look for the word \"whole\" as the first word in the ingredient list.  Choose fish and skinless chicken or turkey more often than red meat. Limit fish, poultry, and meat to 6 oz (170 g) each day.  Limit sweets, desserts, sugars, and sugary drinks.  Choose heart-healthy fats.  Limit cheese to 1 oz (28 g) per day.  Eat more home-cooked food and less restaurant, buffet, and fast food.  Limit fried " foods.  Cook foods using methods other than frying.  Limit canned vegetables. If you do use them, rinse them well to decrease the sodium.  When eating at a restaurant, ask that your food be prepared with less salt, or no salt if possible.  WHAT FOODS CAN I EAT?  Seek help from a dietitian for individual calorie needs.  Grains  Whole grain or whole wheat bread. Brown rice. Whole grain or whole wheat pasta. Quinoa, bulgur, and whole grain cereals. Low-sodium cereals. Corn or whole wheat flour tortillas. Whole grain cornbread. Whole grain crackers. Low-sodium crackers.  Vegetables  Fresh or frozen vegetables (raw, steamed, roasted, or grilled). Low-sodium or reduced-sodium tomato and vegetable juices. Low-sodium or reduced-sodium tomato sauce and paste. Low-sodium or reduced-sodium canned vegetables.   Fruits  All fresh, canned (in natural juice), or frozen fruits.  Meat and Other Protein Products  Ground beef (85% or leaner), grass-fed beef, or beef trimmed of fat. Skinless chicken or turkey. Ground chicken or turkey. Pork trimmed of fat. All fish and seafood. Eggs. Dried beans, peas, or lentils. Unsalted nuts and seeds. Unsalted canned beans.  Dairy  Low-fat dairy products, such as skim or 1% milk, 2% or reduced-fat cheeses, low-fat ricotta or cottage cheese, or plain low-fat yogurt. Low-sodium or reduced-sodium cheeses.  Fats and Oils  Tub margarines without trans fats. Light or reduced-fat mayonnaise and salad dressings (reduced sodium). Avocado. Safflower, olive, or canola oils. Natural peanut or almond butter.  Other  Unsalted popcorn and pretzels.  The items listed above may not be a complete list of recommended foods or beverages. Contact your dietitian for more options.  WHAT FOODS ARE NOT RECOMMENDED?  Grains  White bread. White pasta. White rice. Refined cornbread. Bagels and croissants. Crackers that contain trans fat.  Vegetables  Creamed or fried vegetables. Vegetables in a cheese sauce. Regular canned  vegetables. Regular canned tomato sauce and paste. Regular tomato and vegetable juices.  Fruits  Dried fruits. Canned fruit in light or heavy syrup. Fruit juice.  Meat and Other Protein Products  Fatty cuts of meat. Ribs, chicken wings, zayas, sausage, bologna, salami, chitterlings, fatback, hot dogs, bratwurst, and packaged luncheon meats. Salted nuts and seeds. Canned beans with salt.  Dairy  Whole or 2% milk, cream, half-and-half, and cream cheese. Whole-fat or sweetened yogurt. Full-fat cheeses or blue cheese. Nondairy creamers and whipped toppings. Processed cheese, cheese spreads, or cheese curds.  Condiments  Onion and garlic salt, seasoned salt, table salt, and sea salt. Canned and packaged gravies. Worcestershire sauce. Tartar sauce. Barbecue sauce. Teriyaki sauce. Soy sauce, including reduced sodium. Steak sauce. Fish sauce. Oyster sauce. Cocktail sauce. Horseradish. Ketchup and mustard. Meat flavorings and tenderizers. Bouillon cubes. Hot sauce. Tabasco sauce. Marinades. Taco seasonings. Relishes.  Fats and Oils  Butter, stick margarine, lard, shortening, ghee, and zayas fat. Coconut, palm kernel, or palm oils. Regular salad dressings.  Other  Pickles and olives. Salted popcorn and pretzels.  The items listed above may not be a complete list of foods and beverages to avoid. Contact your dietitian for more information.  WHERE CAN I FIND MORE INFORMATION?  National Heart, Lung, and Blood [place]: www.nhlbi.nih.gov/health/health-topics/topics/dash/     This information is not intended to replace advice given to you by your health care provider. Make sure you discuss any questions you have with your health care provider.     Document Released: 12/06/2012 Document Revised: 01/08/2016 Document Reviewed: 10/22/2014  [employer] Interactive Patient Education ©2016 [employer]

## 2017-04-11 NOTE — TELEPHONE ENCOUNTER
Please call patient and let him know I will like for him to start Lasix one half tablet daily for 4 days with an additional 1 tablet of potassium daily ×4 days and return to clinic Friday for recheck with labs.  I will like to see if this improves his edema in his lower extremities.  I would also like for him to know that he will be receiving a phone call in regards to consult for cardiology and colorectal consult.

## 2017-04-12 ENCOUNTER — TELEPHONE (OUTPATIENT)
Dept: INTERNAL MEDICINE | Facility: CLINIC | Age: 77
End: 2017-04-12

## 2017-04-12 NOTE — TELEPHONE ENCOUNTER
Spoke with Ruddy at [facility] and clarified directions for pt's medication.

## 2017-04-12 NOTE — TELEPHONE ENCOUNTER
----- Message from Becca Hamm sent at 4/12/2017 11:08 AM EDT -----  [facility] CALLED AND STATED THEY HAD A COUPLE QUESTIONS ABOUT RX LASIX. SHE STATED THAT THEY WERE UNSURE OF THE DIRECTIONS. THEY CAN BE REACHED [phone]

## 2017-04-14 DIAGNOSIS — N28.9 RENAL IMPAIRMENT: Primary | ICD-10-CM

## 2017-04-17 ENCOUNTER — TELEPHONE (OUTPATIENT)
Dept: INTERNAL MEDICINE | Facility: CLINIC | Age: 77
End: 2017-04-17

## 2017-04-17 NOTE — TELEPHONE ENCOUNTER
After speaking with pt's son and son informed me that dad is in the hospital as of yesterday and now is in a coma due to kidney failure at [place] and needed me to let the doctor know. Informed Lisseth and she ask that I send this to her and she will reach out to pt's son.

## 2017-04-21 NOTE — TELEPHONE ENCOUNTER
After multiple attempts- I reached son who is in [place] at this time.  Spoke on 4/19 and he reports his dad continued taking large dose of motrin as he was advised to avoid due to knee pain and did nto want to have injections, pmr, pain meds.  He was admitted after going to ER with CP and noted to have pneumonia and UTI and dehydration with ARF.  The patient remains in ICU on dialysis.

## 2017-05-15 ENCOUNTER — TELEPHONE (OUTPATIENT)
Dept: INTERNAL MEDICINE | Facility: CLINIC | Age: 77
End: 2017-05-15

## 2017-05-17 ENCOUNTER — TELEPHONE (OUTPATIENT)
Dept: INTERNAL MEDICINE | Facility: CLINIC | Age: 77
End: 2017-05-17

## 2017-05-18 DIAGNOSIS — N30.00 ACUTE CYSTITIS WITHOUT HEMATURIA: Primary | ICD-10-CM

## 2017-05-18 RX ORDER — NITROFURANTOIN 25; 75 MG/1; MG/1
100 CAPSULE ORAL 2 TIMES DAILY
Qty: 16 CAPSULE | Refills: 0 | Status: SHIPPED | OUTPATIENT
Start: 2017-05-18 | End: 2017-06-06

## 2017-05-19 ENCOUNTER — TELEPHONE (OUTPATIENT)
Dept: CARDIOLOGY | Facility: CLINIC | Age: 77
End: 2017-05-19

## 2017-05-19 ENCOUNTER — TELEPHONE (OUTPATIENT)
Dept: INTERNAL MEDICINE | Facility: CLINIC | Age: 77
End: 2017-05-19

## 2017-05-22 ENCOUNTER — TELEPHONE (OUTPATIENT)
Dept: INTERNAL MEDICINE | Facility: CLINIC | Age: 77
End: 2017-05-22

## 2017-05-24 ENCOUNTER — TELEPHONE (OUTPATIENT)
Dept: INTERNAL MEDICINE | Facility: CLINIC | Age: 77
End: 2017-05-24

## 2017-05-26 ENCOUNTER — OFFICE VISIT (OUTPATIENT)
Dept: INTERNAL MEDICINE | Facility: CLINIC | Age: 77
End: 2017-05-26

## 2017-05-26 VITALS
BODY MASS INDEX: 33.91 KG/M2 | DIASTOLIC BLOOD PRESSURE: 60 MMHG | SYSTOLIC BLOOD PRESSURE: 136 MMHG | RESPIRATION RATE: 20 BRPM | TEMPERATURE: 97.8 F | WEIGHT: 223 LBS | HEART RATE: 84 BPM

## 2017-05-26 DIAGNOSIS — Z13.1 DM (DIABETES MELLITUS SCREEN): ICD-10-CM

## 2017-05-26 DIAGNOSIS — K21.9 GASTROESOPHAGEAL REFLUX DISEASE, ESOPHAGITIS PRESENCE NOT SPECIFIED: ICD-10-CM

## 2017-05-26 DIAGNOSIS — R82.998 LEUKOCYTES IN URINE: ICD-10-CM

## 2017-05-26 DIAGNOSIS — J18.9 PNEUMONIA OF RIGHT LOWER LOBE DUE TO INFECTIOUS ORGANISM: ICD-10-CM

## 2017-05-26 DIAGNOSIS — I25.10 CORONARY ARTERY DISEASE INVOLVING NATIVE HEART WITHOUT ANGINA PECTORIS, UNSPECIFIED VESSEL OR LESION TYPE: ICD-10-CM

## 2017-05-26 DIAGNOSIS — E03.8 SUBCLINICAL HYPOTHYROIDISM: Primary | ICD-10-CM

## 2017-05-26 DIAGNOSIS — A41.9 SEPSIS, DUE TO UNSPECIFIED ORGANISM: ICD-10-CM

## 2017-05-26 DIAGNOSIS — E11.22 TYPE 2 DM WITH CKD STAGE 3 AND HYPERTENSION (HCC): ICD-10-CM

## 2017-05-26 DIAGNOSIS — I12.9 TYPE 2 DM WITH CKD STAGE 3 AND HYPERTENSION (HCC): ICD-10-CM

## 2017-05-26 DIAGNOSIS — N18.32 CHRONIC KIDNEY DISEASE (CKD) STAGE G3B/A3, MODERATELY DECREASED GLOMERULAR FILTRATION RATE (GFR) BETWEEN 30-44 ML/MIN/1.73 SQUARE METER AND ALBUMINURIA CREATININE RATIO GREATER THAN 300 MG/G (C* (HCC): ICD-10-CM

## 2017-05-26 DIAGNOSIS — N17.9 ACUTE RENAL FAILURE, UNSPECIFIED ACUTE RENAL FAILURE TYPE (HCC): ICD-10-CM

## 2017-05-26 DIAGNOSIS — N18.30 TYPE 2 DM WITH CKD STAGE 3 AND HYPERTENSION (HCC): ICD-10-CM

## 2017-05-26 LAB
BILIRUB BLD-MCNC: NEGATIVE MG/DL
CLARITY, POC: CLEAR
COLOR UR: YELLOW
EXPIRATION DATE: ABNORMAL
GLUCOSE UR STRIP-MCNC: ABNORMAL MG/DL
KETONES UR QL: NEGATIVE
LEUKOCYTE EST, POC: ABNORMAL
Lab: ABNORMAL
NITRITE UR-MCNC: NEGATIVE MG/ML
PH UR: 6 [PH] (ref 5–8)
PROT UR STRIP-MCNC: NEGATIVE MG/DL
RBC # UR STRIP: NEGATIVE /UL
SP GR UR: 1.01 (ref 1–1.03)
UROBILINOGEN UR QL: NORMAL

## 2017-05-26 PROCEDURE — 99215 OFFICE O/P EST HI 40 MIN: CPT | Performed by: NURSE PRACTITIONER

## 2017-05-26 PROCEDURE — 36415 COLL VENOUS BLD VENIPUNCTURE: CPT | Performed by: NURSE PRACTITIONER

## 2017-05-26 PROCEDURE — 81003 URINALYSIS AUTO W/O SCOPE: CPT | Performed by: NURSE PRACTITIONER

## 2017-05-26 RX ORDER — ATORVASTATIN CALCIUM 80 MG/1
80 TABLET, FILM COATED ORAL DAILY
Refills: 0 | COMMUNITY
Start: 2017-04-30 | End: 2017-06-06 | Stop reason: SDUPTHER

## 2017-05-26 RX ORDER — FENOFIBRIC ACID 135 MG/1
45 CAPSULE, DELAYED RELEASE ORAL DAILY
Refills: 0 | COMMUNITY
Start: 2017-04-30 | End: 2017-06-06

## 2017-05-26 RX ORDER — INSULIN DETEMIR 100 [IU]/ML
INJECTION, SOLUTION SUBCUTANEOUS
Refills: 0 | COMMUNITY
Start: 2017-04-30 | End: 2017-06-06

## 2017-05-26 RX ORDER — ASPIRIN 81 MG/1
325 TABLET ORAL DAILY
COMMUNITY
End: 2017-06-06

## 2017-05-26 RX ORDER — LANSOPRAZOLE 30 MG/1
30 CAPSULE, DELAYED RELEASE ORAL DAILY
Refills: 0 | COMMUNITY
Start: 2017-04-30 | End: 2017-06-08 | Stop reason: SDUPTHER

## 2017-05-26 RX ORDER — POTASSIUM CHLORIDE 750 MG/1
10 TABLET, FILM COATED, EXTENDED RELEASE ORAL DAILY
COMMUNITY
Start: 2017-04-11 | End: 2017-06-06

## 2017-05-28 LAB
BACTERIA UR CULT: NORMAL
BACTERIA UR CULT: NORMAL

## 2017-05-31 LAB
ALBUMIN SERPL-MCNC: 3.8 G/DL (ref 3.2–4.8)
ALBUMIN/GLOB SERPL: 1.3 G/DL (ref 1.5–2.5)
ALP SERPL-CCNC: 60 U/L (ref 25–100)
ALT SERPL-CCNC: 37 U/L (ref 7–40)
AST SERPL-CCNC: 55 U/L (ref 0–33)
BASOPHILS # BLD AUTO: 0.01 10*3/MM3 (ref 0–0.2)
BASOPHILS NFR BLD AUTO: 0.2 % (ref 0–1)
BILIRUB SERPL-MCNC: 0.4 MG/DL (ref 0.3–1.2)
BUN SERPL-MCNC: 39 MG/DL (ref 9–23)
BUN/CREAT SERPL: 15 (ref 7–25)
CALCIUM SERPL-MCNC: 9.9 MG/DL (ref 8.7–10.4)
CHLORIDE SERPL-SCNC: 96 MMOL/L (ref 99–109)
CO2 SERPL-SCNC: 29 MMOL/L (ref 20–31)
CREAT SERPL-MCNC: 2.6 MG/DL (ref 0.6–1.3)
EOSINOPHIL # BLD AUTO: 0.22 10*3/MM3 (ref 0.1–0.3)
EOSINOPHIL NFR BLD AUTO: 3.9 % (ref 0–3)
ERYTHROCYTE [DISTWIDTH] IN BLOOD BY AUTOMATED COUNT: 15.5 % (ref 11.3–14.5)
GLOBULIN SER CALC-MCNC: 3 GM/DL
GLUCOSE SERPL-MCNC: 245 MG/DL (ref 70–100)
H PYLORI IGA SER-ACNC: <9 UNITS (ref 0–8.9)
H PYLORI IGG SER IA-ACNC: <0.9 U/ML (ref 0–0.8)
H PYLORI IGM SER-ACNC: <9 UNITS (ref 0–8.9)
HCT VFR BLD AUTO: 35.1 % (ref 38.9–50.9)
HGB BLD-MCNC: 10.9 G/DL (ref 13.1–17.5)
IMM GRANULOCYTES # BLD: 0.02 10*3/MM3 (ref 0–0.03)
IMM GRANULOCYTES NFR BLD: 0.4 % (ref 0–0.6)
LYMPHOCYTES # BLD AUTO: 1.38 10*3/MM3 (ref 0.6–4.8)
LYMPHOCYTES NFR BLD AUTO: 24.3 % (ref 24–44)
MCH RBC QN AUTO: 27.3 PG (ref 27–31)
MCHC RBC AUTO-ENTMCNC: 31.1 G/DL (ref 32–36)
MCV RBC AUTO: 88 FL (ref 80–99)
MONOCYTES # BLD AUTO: 0.35 10*3/MM3 (ref 0–1)
MONOCYTES NFR BLD AUTO: 6.2 % (ref 0–12)
NEUTROPHILS # BLD AUTO: 3.7 10*3/MM3 (ref 1.5–8.3)
NEUTROPHILS NFR BLD AUTO: 65 % (ref 41–71)
PLATELET # BLD AUTO: 213 10*3/MM3 (ref 150–450)
POTASSIUM SERPL-SCNC: 4.8 MMOL/L (ref 3.5–5.5)
PROT SERPL-MCNC: 6.8 G/DL (ref 5.7–8.2)
RBC # BLD AUTO: 3.99 10*6/MM3 (ref 4.2–5.76)
SODIUM SERPL-SCNC: 133 MMOL/L (ref 132–146)
T4 SERPL-MCNC: 11.2 MCG/DL (ref 4.7–11.4)
TSH SERPL DL<=0.005 MIU/L-ACNC: 0.64 MIU/ML (ref 0.35–5.35)
WBC # BLD AUTO: 5.68 10*3/MM3 (ref 3.5–10.8)

## 2017-06-05 ENCOUNTER — TELEPHONE (OUTPATIENT)
Dept: INTERNAL MEDICINE | Facility: CLINIC | Age: 77
End: 2017-06-05

## 2017-06-05 NOTE — TELEPHONE ENCOUNTER
Today while patient is taking.  There was a phone call placed to patient's wife prior to his last visit last week to bring in her medications although she did not do so and when they arrived they were unable to tell me what they were taking post discharge.  He did report that he started back on his previous medicines that he had been on prior to hospitalization.  We did have a long discussion in regards to the fact that he needed to take the medications that he was discharged on.  At that same time I received his last dose note from his previous PCP where he was discharged due noncompliance per the staff when I called for his records.  I need to know the medications he is currently taking please have home health assist does as I can't refill until I know these medication /doses.  I concern is he is taking medications that he should not be on presently.  He did tell me at his last visit he was taking Xarelto which was not prescribed but had been given samples to him for his prior PCP in months past.  I did have him stop the Xarelto and continue the Plavix and aspirin 325 mg daily per dc note.

## 2017-06-05 NOTE — TELEPHONE ENCOUNTER
[facility] CALLING BACK  [facility] WILL HAVE OFFICE FAX OVER WHAT THEY HAVE.  IF YOU NEED ANYTHING ELSE TO CALL HER.

## 2017-06-05 NOTE — TELEPHONE ENCOUNTER
----- Message from Florence Falcon sent at 6/5/2017 11:59 AM EDT -----  [phone]  PT WAS HER A WEEK AGO Friday AND THEY WAS SUPPOSE TO HAVE MEDICINE CHANGES AND NEW SCRIPTS CALLED IN TO [facility]

## 2017-06-05 NOTE — TELEPHONE ENCOUNTER
----- Message from Rosa Escoto sent at 6/5/2017  9:58 AM EDT -----  Contact: HOME HEALTH NURSE  HOME HEALTH CALLED AND ASKED IF SHE COULD GET 4 MORE VISITS WITH PATIENT BECAUSE SHE HAS JUST GOTTEN TO THE EDUCATION PART OF HER CARE. HOME HEALTH NURSE WOULD LIKE THE OK FROM DR. CARRINGTON. A GOOD CALL BACK NUMBER [phone]. THANK YOU.

## 2017-06-06 ENCOUNTER — TELEPHONE (OUTPATIENT)
Dept: INTERNAL MEDICINE | Facility: CLINIC | Age: 77
End: 2017-06-06

## 2017-06-06 DIAGNOSIS — Z95.1 HX OF CABG: ICD-10-CM

## 2017-06-06 DIAGNOSIS — R60.9 EDEMA, UNSPECIFIED TYPE: ICD-10-CM

## 2017-06-06 DIAGNOSIS — N18.9 CKD (CHRONIC KIDNEY DISEASE), UNSPECIFIED STAGE: Primary | ICD-10-CM

## 2017-06-06 DIAGNOSIS — I10 ESSENTIAL HYPERTENSION: ICD-10-CM

## 2017-06-06 RX ORDER — ASPIRIN 325 MG
325 TABLET ORAL DAILY
Qty: 90 TABLET | Refills: 1 | Status: SHIPPED | OUTPATIENT
Start: 2017-06-06 | End: 2017-06-08 | Stop reason: SDUPTHER

## 2017-06-06 RX ORDER — QUETIAPINE FUMARATE 25 MG/1
TABLET, FILM COATED ORAL
Qty: 15 TABLET | Refills: 5 | Status: SHIPPED | OUTPATIENT
Start: 2017-06-06 | End: 2017-06-08 | Stop reason: SDUPTHER

## 2017-06-06 RX ORDER — CLOPIDOGREL BISULFATE 75 MG/1
75 TABLET ORAL DAILY
Qty: 90 TABLET | Refills: 1 | Status: SHIPPED | OUTPATIENT
Start: 2017-06-06 | End: 2017-06-08 | Stop reason: SDUPTHER

## 2017-06-06 RX ORDER — DILTIAZEM HYDROCHLORIDE 240 MG/1
240 CAPSULE, EXTENDED RELEASE ORAL DAILY
Qty: 90 CAPSULE | Refills: 1 | Status: SHIPPED | OUTPATIENT
Start: 2017-06-06 | End: 2017-06-08 | Stop reason: SDUPTHER

## 2017-06-06 RX ORDER — BENAZEPRIL HYDROCHLORIDE 20 MG/1
20 TABLET ORAL DAILY
Qty: 90 TABLET | Refills: 1 | Status: SHIPPED | OUTPATIENT
Start: 2017-06-06 | End: 2017-06-08 | Stop reason: SDUPTHER

## 2017-06-06 RX ORDER — POTASSIUM CHLORIDE 750 MG/1
10 TABLET, EXTENDED RELEASE ORAL DAILY
Qty: 30 TABLET | Refills: 5 | Status: SHIPPED | OUTPATIENT
Start: 2017-06-06 | End: 2017-06-06

## 2017-06-06 RX ORDER — ATORVASTATIN CALCIUM 80 MG/1
80 TABLET, FILM COATED ORAL DAILY
Qty: 30 TABLET | Refills: 5 | Status: SHIPPED | OUTPATIENT
Start: 2017-06-06 | End: 2017-06-08 | Stop reason: SDUPTHER

## 2017-06-06 NOTE — TELEPHONE ENCOUNTER
Not my preference but if she is unwilling then dc.  You have his current medication list including what he needs refills on?

## 2017-06-06 NOTE — TELEPHONE ENCOUNTER
Wife stopped by office with all medication bottles:     Taztia XT 240mg/24 hr cap 1 capsule once daily  Furosemide 20mg 1/2 tablet for 4 days with an additional potassium on these days  Imodium (over the counter)  Clopidogrel 75mg 1 tablet by mouth once daily  Pot Chloride ER 10meq tab 1 tab by mouth once daily   Benazepril 20mg 1 tab once daily  Hydrochlorothiazide 25mg tabs 1 tab once daily

## 2017-06-08 ENCOUNTER — TELEPHONE (OUTPATIENT)
Dept: INTERNAL MEDICINE | Facility: CLINIC | Age: 77
End: 2017-06-08

## 2017-06-08 DIAGNOSIS — I10 ESSENTIAL HYPERTENSION: ICD-10-CM

## 2017-06-08 DIAGNOSIS — N18.9 CHRONIC KIDNEY DISEASE, UNSPECIFIED STAGE: Primary | ICD-10-CM

## 2017-06-08 DIAGNOSIS — Z95.1 HX OF CABG: ICD-10-CM

## 2017-06-08 RX ORDER — DILTIAZEM HYDROCHLORIDE 240 MG/1
240 CAPSULE, EXTENDED RELEASE ORAL DAILY
Qty: 90 CAPSULE | Refills: 1 | Status: SHIPPED | OUTPATIENT
Start: 2017-06-08 | End: 2017-07-21 | Stop reason: ALTCHOICE

## 2017-06-08 RX ORDER — LANSOPRAZOLE 30 MG/1
30 CAPSULE, DELAYED RELEASE ORAL DAILY
Qty: 30 CAPSULE | Refills: 2 | Status: SHIPPED | OUTPATIENT
Start: 2017-06-08 | End: 2017-01-01 | Stop reason: SDUPTHER

## 2017-06-08 RX ORDER — ASPIRIN 325 MG
325 TABLET ORAL DAILY
Qty: 90 TABLET | Refills: 1 | Status: SHIPPED | OUTPATIENT
Start: 2017-06-08 | End: 2017-07-21 | Stop reason: SDUPTHER

## 2017-06-08 RX ORDER — INSULIN GLARGINE 100 [IU]/ML
100 INJECTION, SOLUTION SUBCUTANEOUS DAILY
Qty: 10 ML | Refills: 1 | Status: SHIPPED | OUTPATIENT
Start: 2017-06-08 | End: 2017-01-01 | Stop reason: SDUPTHER

## 2017-06-08 RX ORDER — CLOPIDOGREL BISULFATE 75 MG/1
75 TABLET ORAL DAILY
Qty: 90 TABLET | Refills: 0 | Status: SHIPPED | OUTPATIENT
Start: 2017-06-08 | End: 2017-01-01 | Stop reason: SDUPTHER

## 2017-06-08 RX ORDER — ATORVASTATIN CALCIUM 80 MG/1
80 TABLET, FILM COATED ORAL DAILY
Qty: 30 TABLET | Refills: 2 | Status: SHIPPED | OUTPATIENT
Start: 2017-06-08 | End: 2017-01-01 | Stop reason: SDUPTHER

## 2017-06-08 RX ORDER — QUETIAPINE FUMARATE 25 MG/1
TABLET, FILM COATED ORAL
Qty: 15 TABLET | Refills: 2 | Status: SHIPPED | OUTPATIENT
Start: 2017-06-08 | End: 2017-07-21 | Stop reason: SDUPTHER

## 2017-06-08 RX ORDER — BENAZEPRIL HYDROCHLORIDE 20 MG/1
20 TABLET ORAL DAILY
Qty: 90 TABLET | Refills: 2 | Status: SHIPPED | OUTPATIENT
Start: 2017-06-08 | End: 2017-07-21

## 2017-06-08 NOTE — TELEPHONE ENCOUNTER
Spoke with Hayley. Would like to know if Enteric coated Aspirin is okay? Also states that they do not keep Taztia in stock and would like to know if Cartia  mg would be okay?

## 2017-06-08 NOTE — TELEPHONE ENCOUNTER
----- Message from Amber Raymond sent at 6/7/2017  4:21 PM EDT -----  WIFE CALLED AND STATED THAT PATIENT WAS SEEN IN THE HOSPITAL AND MEDS CHANGED    SHE CAME IN THE OFFICE ON Monday WITH MEDICATIONS/LIST REQUESTING REFILLS  BUT PHARMACY HAS NOT RECEIVED     PLEASE SEND TO [facility] IN [place]

## 2017-06-08 NOTE — TELEPHONE ENCOUNTER
----- Message from Becca Hamm sent at 6/8/2017  9:06 AM EDT -----  [facility] [place] CALLED WITH QUESTIONS ON RX LANTUS QUANTITY. SHE STATED THAT THE BOTTLE WILL ONLY LAST HIM 10 DAYS AND WANTED TO KNOW IF 3 BOTTLES COULD BE CALLED IN. SHE ALSO WANTED TO KNOW IF RX ASPRIN IS OKAY TO GIVE PT AS IS. SHE CAN BE REACHED [phone]

## 2017-06-09 ENCOUNTER — TELEPHONE (OUTPATIENT)
Dept: INTERNAL MEDICINE | Facility: CLINIC | Age: 77
End: 2017-06-09

## 2017-06-09 NOTE — TELEPHONE ENCOUNTER
----- Message from Amber Raymond sent at 6/8/2017  3:54 PM EDT -----  [place] [facility]    RECEIVED ORDER BUT MISSING SIGNATURE DATE.     NURSE WILL FAX  [facility]O [facility]. PLEASE DATE  AND FAX BACK

## 2017-06-13 NOTE — TELEPHONE ENCOUNTER
DIANA [phone]    FAX NUMBER [phone]-THEY HAVE NOT GOTTEN THIS FAX.  PLEASE RESEND

## 2017-06-14 ENCOUNTER — LAB (OUTPATIENT)
Dept: INTERNAL MEDICINE | Facility: CLINIC | Age: 77
End: 2017-06-14

## 2017-06-14 DIAGNOSIS — N18.9 CKD (CHRONIC KIDNEY DISEASE), UNSPECIFIED STAGE: ICD-10-CM

## 2017-06-14 PROCEDURE — 36415 COLL VENOUS BLD VENIPUNCTURE: CPT | Performed by: NURSE PRACTITIONER

## 2017-06-15 LAB
BUN SERPL-MCNC: 21 MG/DL (ref 9–23)
BUN/CREAT SERPL: 13.1 (ref 7–25)
CALCIUM SERPL-MCNC: 9.8 MG/DL (ref 8.7–10.4)
CHLORIDE SERPL-SCNC: 103 MMOL/L (ref 99–109)
CO2 SERPL-SCNC: 21 MMOL/L (ref 20–31)
CREAT SERPL-MCNC: 1.6 MG/DL (ref 0.6–1.3)
GLUCOSE SERPL-MCNC: 246 MG/DL (ref 70–100)
POTASSIUM SERPL-SCNC: 4.4 MMOL/L (ref 3.5–5.5)
SODIUM SERPL-SCNC: 138 MMOL/L (ref 132–146)

## 2017-07-06 ENCOUNTER — TELEPHONE (OUTPATIENT)
Dept: INTERNAL MEDICINE | Facility: CLINIC | Age: 77
End: 2017-07-06

## 2017-07-06 NOTE — TELEPHONE ENCOUNTER
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  He has met all his goals.

## 2017-07-21 ENCOUNTER — OFFICE VISIT (OUTPATIENT)
Dept: INTERNAL MEDICINE | Facility: CLINIC | Age: 77
End: 2017-07-21

## 2017-07-21 VITALS
RESPIRATION RATE: 20 BRPM | TEMPERATURE: 97.8 F | DIASTOLIC BLOOD PRESSURE: 64 MMHG | SYSTOLIC BLOOD PRESSURE: 130 MMHG | BODY MASS INDEX: 38.92 KG/M2 | HEART RATE: 72 BPM | WEIGHT: 256 LBS

## 2017-07-21 DIAGNOSIS — R60.0 LOCALIZED EDEMA: ICD-10-CM

## 2017-07-21 DIAGNOSIS — N18.9 CKD (CHRONIC KIDNEY DISEASE), UNSPECIFIED STAGE: Primary | ICD-10-CM

## 2017-07-21 DIAGNOSIS — Z13.1 DM (DIABETES MELLITUS SCREEN): ICD-10-CM

## 2017-07-21 DIAGNOSIS — Z71.85 IMMUNIZATION COUNSELING: ICD-10-CM

## 2017-07-21 DIAGNOSIS — R06.83 SNORING: ICD-10-CM

## 2017-07-21 DIAGNOSIS — E11.9 TYPE 2 DIABETES MELLITUS WITHOUT COMPLICATION, WITH LONG-TERM CURRENT USE OF INSULIN (HCC): ICD-10-CM

## 2017-07-21 DIAGNOSIS — Z79.4 TYPE 2 DIABETES MELLITUS WITHOUT COMPLICATION, WITH LONG-TERM CURRENT USE OF INSULIN (HCC): ICD-10-CM

## 2017-07-21 DIAGNOSIS — Z23 IMMUNIZATION DUE: ICD-10-CM

## 2017-07-21 LAB
EXPIRATION DATE: NORMAL
HBA1C MFR BLD: 7.9 %
Lab: NORMAL

## 2017-07-21 PROCEDURE — 83036 HEMOGLOBIN GLYCOSYLATED A1C: CPT | Performed by: NURSE PRACTITIONER

## 2017-07-21 PROCEDURE — 99214 OFFICE O/P EST MOD 30 MIN: CPT | Performed by: NURSE PRACTITIONER

## 2017-07-21 PROCEDURE — G0009 ADMIN PNEUMOCOCCAL VACCINE: HCPCS | Performed by: NURSE PRACTITIONER

## 2017-07-21 PROCEDURE — 90670 PCV13 VACCINE IM: CPT | Performed by: NURSE PRACTITIONER

## 2017-07-21 RX ORDER — QUETIAPINE FUMARATE 25 MG/1
25 TABLET, FILM COATED ORAL NIGHTLY
Qty: 30 TABLET | Refills: 2 | Status: SHIPPED | OUTPATIENT
Start: 2017-07-21 | End: 2017-01-01 | Stop reason: SDUPTHER

## 2017-07-21 RX ORDER — FUROSEMIDE 20 MG/1
TABLET ORAL
Qty: 2 TABLET | Refills: 0 | Status: SHIPPED | OUTPATIENT
Start: 2017-07-21 | End: 2017-07-24 | Stop reason: DRUGHIGH

## 2017-07-21 RX ORDER — DILTIAZEM HYDROCHLORIDE 240 MG/1
240 CAPSULE, EXTENDED RELEASE ORAL 2 TIMES DAILY
COMMUNITY
Start: 2017-06-08 | End: 2017-01-01 | Stop reason: ALTCHOICE

## 2017-07-21 RX ORDER — ASPIRIN 325 MG
325 TABLET, DELAYED RELEASE (ENTERIC COATED) ORAL DAILY
COMMUNITY
Start: 2017-06-08 | End: 2017-01-01 | Stop reason: SDUPTHER

## 2017-07-21 RX ORDER — IBUPROFEN 800 MG
400 TABLET ORAL DAILY
COMMUNITY

## 2017-07-21 NOTE — PROGRESS NOTES
Chief Complaint   Patient presents with   • Chronic Kidney Disease        Subjective     History of Present Illness   The pt is here today with wife for f/u.    Has cardiology appt is on  mon nephro tues appt times days reviewed with pt edema increased per wife eats a large amt of bread    Declines colon due to missed day and prep on early day and will not do again.     Taking insulin 60 u daily 110 avg occas 75 feels shakey and takes oj then is ok    Up 33#    The following portions of the patient's history were reviewed and updated as appropriate: allergies, current medications, past family history, past medical history, past social history, past surgical history and problem list.    Review of Systems   Constitutional: Negative for activity change, appetite change, fatigue and fever.   Respiratory: Negative for shortness of breath.    Cardiovascular: Positive for leg swelling. Negative for chest pain.   Gastrointestinal: Negative for abdominal pain, constipation, diarrhea, nausea and vomiting.   Endocrine: Negative for polydipsia, polyphagia and polyuria.   Genitourinary: Negative for dysuria.   Musculoskeletal: Negative for arthralgias.   Neurological: Negative for dizziness and headaches.   Hematological: Does not bruise/bleed easily.   All other systems reviewed and are negative.      Objective   Physical Exam   Constitutional: He is oriented to person, place, and time. He appears well-developed and well-nourished.   HENT:   Head: Normocephalic and atraumatic.   Eyes: Conjunctivae are normal.   Neck: No thyromegaly present.   Cardiovascular: Normal rate, regular rhythm and normal heart sounds.    Pulmonary/Chest: Effort normal and breath sounds normal.   Abdominal: Soft. Bowel sounds are normal. He exhibits no distension. There is no tenderness.   Musculoskeletal: Normal range of motion. Edema: mod edema BLEs.   Lymphadenopathy:     He has no cervical adenopathy.   Neurological: He is alert and oriented to  person, place, and time.   Skin: Skin is warm and dry.   Psychiatric: He has a normal mood and affect. His behavior is normal.   Nursing note and vitals reviewed.      Results for orders placed or performed in visit on 07/21/17   POC Glycosylated Hemoglobin (Hb A1C)   Result Value Ref Range    Hemoglobin A1C 7.9 %    Lot Number [id number]     Expiration Date 14504         Assessment/Plan   Damaso was seen today for chronic kidney disease.    Diagnoses and all orders for this visit:    CKD (chronic kidney disease), unspecified stage    DM (diabetes mellitus screen)  -     Ambulatory Referral to Ophthalmology    Type 2 diabetes mellitus without complication, with long-term current use of insulin  -     POC Glycosylated Hemoglobin (Hb A1C)    Immunization counseling  -     Discontinue: pneumococcal conj. 13-valent (PREVNAR-13) vaccine 0.5 mL; Inject 0.5 mL into the shoulder, thigh, or buttocks 1 (One) Time.  -     Pneumococcal Conjugate Vaccine 13-Valent All    Immunization due  -     Discontinue: pneumococcal conj. 13-valent (PREVNAR-13) vaccine 0.5 mL; Inject 0.5 mL into the shoulder, thigh, or buttocks 1 (One) Time.  -     Pneumococcal Conjugate Vaccine 13-Valent All    Localized edema  -     Discontinue: furosemide (LASIX) 20 MG tablet; 1/2 tab daily 3d only (Patient taking differently: Daily As Needed. 1/2 tab daily 3d only)    Other orders  -     QUEtiapine (SEROQUEL) 25 MG tablet; Take 1 tablet by mouth Every Night.    cardiology to follow  Renal to follow  Has swelling however with ckd concerning.     He has inquired about old med samples from his prior pcp. Encouraged him again to get rid of all other blood thinner/any med for that matter he has had prior and only take what is rxd.    Lasix 20mg 1/2 tab daily 3d only banana no salt sub no salt added to anything    Encouraged grogans for jobst compression however unsure will ware or afford and low salt due to BLE swelling intermittent does keep legs  up.    Addendum cardiology saw pt recomm daniel eval will order.  Also recomm off cardizem (due to swelling in legs) and consider ace however may use B blocker depending on renal (Dr Morales) recommendation.    Follow up one month  RTC/call  If symptoms worsen  Meds MOA and SE's reviewed and pt v/u

## 2017-07-24 ENCOUNTER — CONSULT (OUTPATIENT)
Dept: CARDIOLOGY | Facility: CLINIC | Age: 77
End: 2017-07-24

## 2017-07-24 VITALS
BODY MASS INDEX: 36.59 KG/M2 | HEIGHT: 70 IN | SYSTOLIC BLOOD PRESSURE: 140 MMHG | HEART RATE: 76 BPM | WEIGHT: 255.6 LBS | DIASTOLIC BLOOD PRESSURE: 76 MMHG

## 2017-07-24 DIAGNOSIS — E78.5 DYSLIPIDEMIA: ICD-10-CM

## 2017-07-24 DIAGNOSIS — I10 ESSENTIAL HYPERTENSION: ICD-10-CM

## 2017-07-24 DIAGNOSIS — R60.0 LOCALIZED EDEMA: ICD-10-CM

## 2017-07-24 DIAGNOSIS — I25.10 CORONARY ARTERY DISEASE INVOLVING NATIVE CORONARY ARTERY OF NATIVE HEART WITHOUT ANGINA PECTORIS: Primary | ICD-10-CM

## 2017-07-24 PROCEDURE — 93000 ELECTROCARDIOGRAM COMPLETE: CPT | Performed by: INTERNAL MEDICINE

## 2017-07-24 PROCEDURE — 99214 OFFICE O/P EST MOD 30 MIN: CPT | Performed by: INTERNAL MEDICINE

## 2017-07-24 RX ORDER — FUROSEMIDE 40 MG/1
40 TABLET ORAL DAILY
Qty: 90 TABLET | Refills: 3 | Status: SHIPPED | OUTPATIENT
Start: 2017-07-24 | End: 2017-01-01 | Stop reason: SDUPTHER

## 2017-07-24 NOTE — PROGRESS NOTES
Subjective:     Encounter Date:07/24/2017      Patient ID: Damaso Ivan is a 77 y.o. male.    Chief Complaint:Establish Care and Edema    PROBLEM LIST:  1. Coronary artery disease  a. CABG LIMA to LAD, VG to PDA  b. 2012 Catheterization Dr. Timmons patent LIMA graft to LAD, 20% OM, 75% mid RCA (integrity or metal stent).  Normal EF.  Vein graft to PDA occluded  2. Hypertension  3. Dyslipidemia  4. Diabetes mellitus  5. Chronic kidney disease  6. History of rectal cancer s/p XRT  7. GERD  8. Surgeries:  a. CABG  b. Right total knee replacement      Allergies   Allergen Reactions   • Penicillins Hives         Current Outpatient Prescriptions:   •  ACCU-CHEK COMPACT PLUS test strip, , Disp: , Rfl:   •  aspirin  MG tablet, Take 325 mg by mouth Daily., Disp: , Rfl:   •  atorvastatin (LIPITOR) 80 MG tablet, Take 1 tablet by mouth Daily., Disp: 30 tablet, Rfl: 2  •  CARTIA  MG 24 hr capsule, Take 240 mg by mouth 2 (Two) Times a Day., Disp: , Rfl:   •  Cholecalciferol (VITAMIN D3) 400 UNITS capsule, Take 400 Units by mouth Daily., Disp: , Rfl:   •  clopidogrel (PLAVIX) 75 MG tablet, Take 1 tablet by mouth Daily., Disp: 90 tablet, Rfl: 0  •  furosemide (LASIX) 20 MG tablet, 1/2 tab daily 3d only (Patient taking differently: Daily As Needed. 1/2 tab daily 3d only), Disp: 2 tablet, Rfl: 0  •  lansoprazole (PREVACID) 30 MG capsule, Take 1 capsule by mouth Daily., Disp: 30 capsule, Rfl: 2  •  LANTUS 100 UNIT/ML injection, Inject 100 Units under the skin Daily. (Patient taking differently: Inject 60 Units under the skin Daily.), Disp: 10 mL, Rfl: 1  •  QUEtiapine (SEROQUEL) 25 MG tablet, Take 1 tablet by mouth Every Night., Disp: 30 tablet, Rfl: 2        History of Present Illness    Patient is a 77-year-old male who we are seeing today for reestablishment of cardiac care as well as complaints of edema.  He has previous history of coronary disease with previous CABG and stenting.  His most recent intervention was  performed in 2012.  Since that time he is overall done well from a cardiac standpoint.  Currently denies any chest pain, pressure, tightness.  Denies any increasing shortness of breath.  No noted syncope, near syncope.  He does have complaints of lower extremity edema.  States that this is better with elevation of his extremities.  Was recently prescribed diuretic therapy from his primary care physician which she will start tomorrow.  Was referred here for further evaluation.  Patient thinks his overall ability to exert himself is improved from this time last year.  He has no chest pain when she had prior to his CABG and stent.  He has had peripheral edema intermittently for the last year, but certainly is worsened over the last several months.  Recent hospitalization at [facility] (proxy 3 months ago) for which she presented with hypotension/shock which was felt to be septic in nature.  He had acute renal failure with this.    The following portions of the patient's history were reviewed and updated as appropriate: allergies, current medications, past family history, past medical history, past social history, past surgical history and problem list.    Family History   Problem Relation Age of Onset   • Alzheimer's disease Mother    • Heart attack Father        Social History   Substance Use Topics   • Smoking status: Former Smoker     Packs/day: 3.00     Years: 40.00     Types: Cigarettes     Quit date: 1/1/1998   • Smokeless tobacco: Never Used   • Alcohol use No         Review of Systems   Constitution: Positive for malaise/fatigue. Negative for fever and weakness.   HENT: Negative for headaches and nosebleeds.    Eyes: Negative for redness and visual disturbance.   Cardiovascular: Positive for leg swelling. Negative for orthopnea, palpitations and paroxysmal nocturnal dyspnea.   Respiratory: Negative for cough, snoring, sputum production and wheezing.    Hematologic/Lymphatic: Negative for bleeding  "problem.   Skin: Negative for flushing, itching and rash.   Musculoskeletal: Negative for falls, joint pain and muscle cramps.   Gastrointestinal: Positive for heartburn. Negative for abdominal pain, diarrhea, nausea and vomiting.   Genitourinary: Negative for hematuria.   Neurological: Positive for excessive daytime sleepiness. Negative for dizziness and tremors.   Psychiatric/Behavioral: Negative for substance abuse. The patient is not nervous/anxious.           Objective:    height is 70\" (177.8 cm) and weight is 255 lb 9.6 oz (116 kg). His blood pressure is 140/76 and his pulse is 76.         Physical Exam   Constitutional: He is oriented to person, place, and time. He appears well-developed and well-nourished.   HENT:   Head: Normocephalic and atraumatic.   Mouth/Throat: Oropharynx is clear and moist.   Eyes: Conjunctivae are normal. Pupils are equal, round, and reactive to light.   Neck: Normal carotid pulses and no JVD present. Carotid bruit is not present. No thyromegaly present.   Cardiovascular: Normal rate, regular rhythm, S1 normal and S2 normal.  Exam reveals no gallop and no friction rub.    No murmur heard.  Pulses:       Carotid pulses are 2+ on the right side, and 2+ on the left side.       Dorsalis pedis pulses are 2+ on the right side, and 2+ on the left side.        Posterior tibial pulses are 2+ on the right side, and 2+ on the left side.   Pulmonary/Chest: No respiratory distress. He has no wheezes. He has no rales. He exhibits no tenderness.   Abdominal: He exhibits no distension, no abdominal bruit and no mass. There is no hepatosplenomegaly. There is no tenderness. There is no rebound.   Musculoskeletal: He exhibits edema (2+ bilateral pretibial edema). He exhibits no tenderness or deformity.   Lymphadenopathy:     He has no cervical adenopathy.   Neurological: He is alert and oriented to person, place, and time. He has normal strength.   Skin: Skin is warm and dry. No rash noted. No " cyanosis. Nails show no clubbing.   Psychiatric: He has a normal mood and affect. Cognition and memory are normal.         ECG 12 Lead  Date/Time: 7/24/2017 5:06 PM  Performed by: SHARIFA RICE  Authorized by: SHARIFA RICE   Rhythm: sinus rhythm  Conduction: 1st degree  Comments: No ST segment changes                  Assessment:   Assessment/Plan      Damaso was seen today for establish care and edema.    Diagnoses and all orders for this visit:    Coronary artery disease involving native coronary artery of native heart without angina pectoris    Essential hypertension    Dyslipidemia    Localized edema  -     Compression Stockings    Other orders  -     furosemide (LASIX) 40 MG tablet; Take 1 tablet by mouth Daily.  -     ECG 12 Lead      Coronary artery disease.,  20 years post CABG in 5 years status post PCI.  Angina.  Had some testing done during his time of septic shock.  As he did not appear to have any ischemic changes by EKG or troponins at that time, I'm not worried about hemodynamically significant coronary artery disease.    Edema.  Multifactorial.  Some mild diastolic dysfunction, we'll need to obtain his echocardiogram [facility], but suspect primarily due to venous disease plus minus some renal insufficiency.  May be a component of his diltiazem as well.  At this time, we recommend he place compression stockings (prescription given).  Would recommend discontinuing diltiazem, and using other antihypertensives.  Perhaps an ACE inhibitor getting given his diabetic nephropathy, however as he is seeing Dr. Hernandez's primary nephrologist tomorrow, we'll defer this decision to him.  If patient is not an Ace inhibitor candidate, consider beta blocker.  We will also increase his furosemide to 40 mg daily.    Possible obstructive sleep apnea, outpatient sleep evaluation.    Further recommendations pending the above.       Brandee DUENAS scribed portions of this dictation for    [employer].    Dictated utilizing Dragon dictation    I, Sunil Jama MD, personally performed the services described in this documentation as scribed by the above individual in my presence, and it is both accurate and complete

## 2017-08-04 ENCOUNTER — TELEPHONE (OUTPATIENT)
Dept: INTERNAL MEDICINE | Facility: CLINIC | Age: 77
End: 2017-08-04

## 2017-08-15 NOTE — TELEPHONE ENCOUNTER
I spoken with patient's wife in regards to no-show visits.  Any further evaluation of this patient will need to be discharged

## 2017-08-18 NOTE — TELEPHONE ENCOUNTER
----- Message from Florence Falcon sent at 8/17/2017  4:24 PM EDT -----  TERRENCE BUSH [phone]  REFILL ON ACCU CHECK COMPACT PLUS STRIPS

## 2017-09-07 NOTE — TELEPHONE ENCOUNTER
----- Message from Luba Escudero sent at 9/7/2017  9:28 AM EDT -----  Patient's pharmacist, Shawna called with questions on patients medication, states patient has stopped taking medication because it began making him sick, she also requested since the medication is making the patient sick if it can be changed to something different. Pharmacist requested Diltivem la-tablet     Medication- Tavita 240MG capsules      Pharmacy- [facility]    Call back number for pharmacist- [phone]

## 2017-09-11 NOTE — TELEPHONE ENCOUNTER
"Lisseth,     There is a message from 08/28/2017 stating that it is okay to change pt's Cartia to Diltiazem. Pt states that Cartia \"smells and tastes bad\" and he is unable to swallow. Just need to know mg for Diltiazem.  "

## 2017-09-18 PROBLEM — Z95.1 HX OF CABG: Status: ACTIVE | Noted: 2017-01-01

## 2017-09-18 PROBLEM — K21.9 GASTROESOPHAGEAL REFLUX DISEASE: Status: ACTIVE | Noted: 2017-01-01

## 2017-09-18 PROBLEM — G47.00 INSOMNIA: Status: ACTIVE | Noted: 2017-01-01

## 2017-09-18 PROBLEM — E55.9 VITAMIN D DEFICIENCY DISEASE: Status: ACTIVE | Noted: 2017-01-01

## 2017-09-18 PROBLEM — R60.0 LOCALIZED EDEMA: Status: ACTIVE | Noted: 2017-01-01

## 2017-09-18 PROBLEM — Z79.4 TYPE 2 DIABETES MELLITUS WITHOUT COMPLICATION, WITH LONG-TERM CURRENT USE OF INSULIN (HCC): Status: ACTIVE | Noted: 2017-01-01

## 2017-09-18 PROBLEM — E11.9 TYPE 2 DIABETES MELLITUS WITHOUT COMPLICATION, WITH LONG-TERM CURRENT USE OF INSULIN (HCC): Status: ACTIVE | Noted: 2017-01-01

## 2017-09-18 NOTE — PROGRESS NOTES
Chief Complaint   Patient presents with   • Snoring        Subjective     History of Present Illness   The pt is here to f/u    The pt takes FBG  And if >140  He takes 35 u insulin in am (pt does this indep) and then takes 70u at hs no matter what     He had been off BP med but noted HA at hs so he resatrted.    He still has diuretic and he needs sock for feet --does not want rx for jobst but states has a coupon for compression stocking they are going to get free    The following portions of the patient's history were reviewed and updated as appropriate: allergies, current medications, past family history, past medical history, past social history, past surgical history and problem list.    Review of Systems   All other systems reviewed and are negative.  A complete of headache fever arthralgias sweats chills no change in appetite or activity no runny stuffy nose cough congestion no visual changes headache dizziness no chest pain shortness of breath he continues to have edema in his lower extremities however this has not worsened no abdominal pain nausea vomiting diarrhea no new lumps or bumps or rashes.  No change in sleep.    Objective   Physical Exam   Constitutional: He is oriented to person, place, and time. He appears well-developed and well-nourished.   HENT:   Head: Normocephalic and atraumatic.   Right Ear: External ear normal.   Left Ear: External ear normal.   Mouth/Throat: Oropharynx is clear and moist.   Eyes: Conjunctivae are normal. Pupils are equal, round, and reactive to light.   Neck: Normal range of motion. Neck supple.   Cardiovascular: Normal rate, regular rhythm, normal heart sounds and intact distal pulses.    No murmur heard.  Pulmonary/Chest: Effort normal and breath sounds normal.   Abdominal: Soft. Bowel sounds are normal. He exhibits no distension. There is no tenderness. There is no rebound and no guarding.   Musculoskeletal: Normal range of motion. He exhibits edema.    Damaso had a  diabetic foot exam performed today.   During the foot exam he had a monofilament test performed.    Vascular Status -  His exam exhibits right foot edema. His exam exhibits right foot vasculature abnormal. His exam exhibits left foot vasculature normal and left foot edema.  Neurological: He is alert and oriented to person, place, and time.   Skin: Skin is warm and dry.   Psychiatric: He has a normal mood and affect. His behavior is normal.   Nursing note and vitals reviewed.      cheryl marie  Results for orders placed or performed in visit on 09/18/17   Comprehensive Metabolic Panel   Result Value Ref Range    Glucose 206 (H) 70 - 100 mg/dL    BUN 27 (H) 9 - 23 mg/dL    Creatinine 1.70 (H) 0.60 - 1.30 mg/dL    eGFR Non African Am 39 (L) >60 mL/min/1.73    eGFR  Am 48 (L) >60 mL/min/1.73    BUN/Creatinine Ratio 15.9 7.0 - 25.0    Sodium 139 132 - 146 mmol/L    Potassium 4.3 3.5 - 5.5 mmol/L    Chloride 101 99 - 109 mmol/L    Total CO2 31.0 20.0 - 31.0 mmol/L    Calcium 9.4 8.7 - 10.4 mg/dL    Total Protein 7.0 5.7 - 8.2 g/dL    Albumin 4.10 3.20 - 4.80 g/dL    Globulin 2.9 gm/dL    A/G Ratio 1.4 (L) 1.5 - 2.5 g/dL    Total Bilirubin 0.4 0.3 - 1.2 mg/dL    Alkaline Phosphatase 99 25 - 100 U/L    AST (SGOT) 27 0 - 33 U/L    ALT (SGPT) 29 7 - 40 U/L   Lipid Panel   Result Value Ref Range    Total Cholesterol 146 0 - 200 mg/dL    Triglycerides 287 (H) 0 - 150 mg/dL    HDL Cholesterol 32 (L) 40 - 60 mg/dL    VLDL Cholesterol 57.4 mg/dL    LDL Cholesterol  57 0 - 100 mg/dL   TSH   Result Value Ref Range    TSH 0.752 0.350 - 5.350 mIU/mL   POC Microalbumin   Result Value Ref Range    Microalbumin, Urine 30     Creatinine, Urine 100     A/C 30-300mg/g ABNORMAL     Lot Number [id number]     Expiration Date 11-30-18    POC Urinalysis Dipstick, Automated   Result Value Ref Range    Color Yellow Yellow, Straw, Dark Yellow, Gaby    Clarity, UA Clear Clear    Glucose, UA Negative Negative, 1000 mg/dL (3+) mg/dL    Bilirubin  Negative Negative    Ketones, UA Negative Negative    Specific Gravity  1.015 1.005 - 1.030    Blood, UA Negative Negative    pH, Urine 6.0 5.0 - 8.0    Protein, POC Negative Negative mg/dL    Urobilinogen, UA Normal Normal    Leukocytes Negative Negative    Nitrite, UA Negative Negative    Lot Number [id number]     Expiration Date 5-31-18    POC Glycosylated Hemoglobin (Hb A1C)   Result Value Ref Range    Hemoglobin A1C 8.3 %    Lot Number [id number]     Expiration Date 5-19    CBC & Differential   Result Value Ref Range    WBC 5.55 3.50 - 10.80 10*3/mm3    RBC 4.84 4.20 - 5.76 10*6/mm3    Hemoglobin 11.1 (L) 13.1 - 17.5 g/dL    Hematocrit 36.6 (L) 38.9 - 50.9 %    MCV 75.6 (L) 80.0 - 99.0 fL    MCH 22.9 (L) 27.0 - 31.0 pg    MCHC 30.3 (L) 32.0 - 36.0 g/dL    RDW 16.0 (H) 11.3 - 14.5 %    Platelets 186 150 - 450 10*3/mm3    Neutrophil Rel % 55.5 41.0 - 71.0 %    Lymphocyte Rel % 32.6 24.0 - 44.0 %    Monocyte Rel % 8.1 0.0 - 12.0 %    Eosinophil Rel % 3.2 (H) 0.0 - 3.0 %    Basophil Rel % 0.4 0.0 - 1.0 %    Neutrophils Absolute 3.08 1.50 - 8.30 10*3/mm3    Lymphocytes Absolute 1.81 0.60 - 4.80 10*3/mm3    Monocytes Absolute 0.45 0.00 - 1.00 10*3/mm3    Eosinophils Absolute 0.18 0.00 - 0.30 10*3/mm3    Basophils Absolute 0.02 0.00 - 0.20 10*3/mm3    Immature Granulocyte Rel % 0.2 0.0 - 0.6 %    Immature Grans Absolute 0.01 0.00 - 0.03 10*3/mm3   Manual Differential   Result Value Ref Range    Differential Comment Comment     Comment Comment     Plt Comment Comment         Assessment/Plan   Damaso was seen today for snoring.    Diagnoses and all orders for this visit:    CKD (chronic kidney disease), unspecified stage  -     Ambulatory Referral to Ophthalmology  -     POC Microalbumin  -     POC Urinalysis Dipstick, Automated  -     POC Glycosylated Hemoglobin (Hb A1C)  -     CBC & Differential  -     Comprehensive Metabolic Panel  -     Lipid Panel  -     TSH  -     Cancel: POC Urinalysis Dipstick,  Automated    Dyslipidemia  -     atorvastatin (LIPITOR) 80 MG tablet; Take 1 tablet by mouth Every Night.  -     Ambulatory Referral to Ophthalmology  -     POC Microalbumin  -     POC Urinalysis Dipstick, Automated  -     POC Glycosylated Hemoglobin (Hb A1C)  -     CBC & Differential  -     Comprehensive Metabolic Panel  -     Lipid Panel  -     TSH  -     Cancel: POC Urinalysis Dipstick, Automated    Essential hypertension  -     Ambulatory Referral to Ophthalmology  -     POC Microalbumin  -     POC Urinalysis Dipstick, Automated  -     POC Glycosylated Hemoglobin (Hb A1C)  -     CBC & Differential  -     Comprehensive Metabolic Panel  -     Lipid Panel  -     TSH  -     Cancel: POC Urinalysis Dipstick, Automated    Coronary artery disease involving native coronary artery of native heart without angina pectoris  -     clopidogrel (PLAVIX) 75 MG tablet; Take 1 tablet by mouth Daily.  -     Ambulatory Referral to Ophthalmology  -     POC Microalbumin  -     POC Urinalysis Dipstick, Automated  -     POC Glycosylated Hemoglobin (Hb A1C)  -     CBC & Differential  -     Comprehensive Metabolic Panel  -     Lipid Panel  -     TSH  -     Cancel: POC Urinalysis Dipstick, Automated    Type 2 diabetes mellitus without complication, with long-term current use of insulin  -     ACCU-CHEK COMPACT PLUS test strip; Blood glucose twice daily  -     LANTUS 100 UNIT/ML injection; Inject 70 Units under the skin Daily.  -     Ambulatory Referral to Ophthalmology  -     POC Microalbumin  -     POC Urinalysis Dipstick, Automated  -     POC Glycosylated Hemoglobin (Hb A1C)  -     CBC & Differential  -     Comprehensive Metabolic Panel  -     Lipid Panel  -     TSH  -     Cancel: POC Urinalysis Dipstick, Automated    Localized edema  -     furosemide (LASIX) 40 MG tablet; Take 1 tablet by mouth Daily.  -     Ambulatory Referral to Ophthalmology  -     POC Microalbumin  -     POC Urinalysis Dipstick, Automated  -     POC Glycosylated  Hemoglobin (Hb A1C)  -     CBC & Differential  -     Comprehensive Metabolic Panel  -     Lipid Panel  -     TSH  -     Cancel: POC Urinalysis Dipstick, Automated    Gastroesophageal reflux disease, esophagitis presence not specified  -     lansoprazole (PREVACID) 30 MG capsule; Take 1 capsule by mouth Daily.  -     Ambulatory Referral to Ophthalmology  -     POC Microalbumin  -     POC Urinalysis Dipstick, Automated  -     POC Glycosylated Hemoglobin (Hb A1C)  -     CBC & Differential  -     Comprehensive Metabolic Panel  -     Lipid Panel  -     TSH  -     Cancel: POC Urinalysis Dipstick, Automated    Vitamin D deficiency disease  -     Ambulatory Referral to Ophthalmology  -     POC Microalbumin  -     POC Urinalysis Dipstick, Automated  -     POC Glycosylated Hemoglobin (Hb A1C)  -     CBC & Differential  -     Comprehensive Metabolic Panel  -     Lipid Panel  -     TSH  -     Cancel: POC Urinalysis Dipstick, Automated    Hx of CABG  -     clopidogrel (PLAVIX) 75 MG tablet; Take 1 tablet by mouth Daily.  -     Ambulatory Referral to Ophthalmology  -     POC Microalbumin  -     POC Urinalysis Dipstick, Automated  -     POC Glycosylated Hemoglobin (Hb A1C)  -     CBC & Differential  -     Comprehensive Metabolic Panel  -     Lipid Panel  -     TSH  -     Cancel: POC Urinalysis Dipstick, Automated    Insomnia, unspecified type  -     QUEtiapine (SEROQUEL) 25 MG tablet; Take 1 tablet by mouth Every Night.  -     Ambulatory Referral to Ophthalmology  -     POC Microalbumin  -     POC Urinalysis Dipstick, Automated  -     POC Glycosylated Hemoglobin (Hb A1C)  -     CBC & Differential  -     Comprehensive Metabolic Panel  -     Lipid Panel  -     TSH  -     Cancel: POC Urinalysis Dipstick, Automated    Other orders  -     Manual Differential          Return in about 3 months (around 12/18/2017) for Next scheduled follow up. wellness soon as well  RTC/call  If symptoms worsen  Meds MOA and SE's reviewed and pt v/u

## 2017-09-18 NOTE — PATIENT INSTRUCTIONS
"Basic Carbohydrate Counting for Diabetes Mellitus  Carbohydrate counting is a method for keeping track of the amount of carbohydrates you eat. Eating carbohydrates naturally increases the level of sugar (glucose) in your blood, so it is important for you to know the amount that is okay for you to have in every meal. Carbohydrate counting helps keep the level of glucose in your blood within normal limits. The amount of carbohydrates allowed is different for every person. A dietitian can help you calculate the amount that is right for you. Once you know the amount of carbohydrates you can have, you can count the carbohydrates in the foods you want to eat.  Carbohydrates are found in the following foods:  · Grains, such as breads and cereals.  · Dried beans and soy products.  · Starchy vegetables, such as potatoes, peas, and corn.  · Fruit and fruit juices.  · Milk and yogurt.  · Sweets and snack foods, such as cake, cookies, candy, chips, soft drinks, and fruit drinks.  CARBOHYDRATE COUNTING  There are two ways to count the carbohydrates in your food. You can use either of the methods or a combination of both.  Reading the \"Nutrition Facts\" on Packaged Food  The \"Nutrition Facts\" is an area that is included on the labels of almost all packaged food and beverages in the [place]. It includes the serving size of that food or beverage and information about the nutrients in each serving of the food, including the grams (g) of carbohydrate per serving.   Decide the number of servings of this food or beverage that you will be able to eat or drink. Multiply that number of servings by the number of grams of carbohydrate that is listed on the label for that serving. The total will be the amount of carbohydrates you will be having when you eat or drink this food or beverage.  Learning Standard Serving Sizes of Food  When you eat food that is not packaged or does not include \"Nutrition Facts\" on the label, you need to " measure the servings in order to count the amount of carbohydrates. A serving of most carbohydrate-rich foods contains about 15 g of carbohydrates. The following list includes serving sizes of carbohydrate-rich foods that provide 15 g of carbohydrate per serving:    · 1 slice of bread (1 oz) or 1 six-inch tortilla.    · ½ of a hamburger bun or English muffin.  · 4-6 crackers.  · ¾ cup unsweetened dry cereal.    · ½ cup hot cereal.  · ? cup rice or pasta.    · ½ cup mashed potatoes or ¼ of a large baked potato.  · 1 cup fresh fruit or one small piece of fruit.    · ½ cup canned or frozen fruit or fruit juice.  · 1 cup milk.  · ? cup plain fat-free yogurt or yogurt sweetened with artificial sweeteners.  · ½ cup cooked dried beans or starchy vegetable, such as peas, corn, or potatoes.    Decide the number of standard-size servings that you will eat. Multiply that number of servings by 15 (the grams of carbohydrates in that serving). For example, if you eat 2 cups of strawberries, you will have eaten 2 servings and 30 g of carbohydrates (2 servings x 15 g = 30 g). For foods such as soups and casseroles, in which more than one food is mixed in, you will need to count the carbohydrates in each food that is included.  EXAMPLE OF CARBOHYDRATE COUNTING  Sample Dinner   · 3 oz chicken breast.  · ? cup of brown rice.  · ½ cup of corn.  · 1 cup milk.    · 1 cup strawberries with sugar-free whipped topping.    Carbohydrate Calculation   Step 1: Identify the foods that contain carbohydrates:   · Rice.    · Corn.    · Milk.    · Strawberries.  Step 2: Calculate the number of servings eaten of each:   · 2 servings of rice.    · 1 serving of corn.    · 1 serving of milk.    · 1 serving of strawberries.  Step 3:  Multiply each of those number of servings by 15 g:   · 2 servings of rice x 15 g = 30 g.    · 1 serving of corn x 15 g = 15 g.    · 1 serving of milk x 15 g = 15 g.    · 1 serving of strawberries x 15 g = 15 g.  Step 4: Add  together all of the amounts to find the total grams of carbohydrates eaten: 30 g + 15 g + 15 g + 15 g = 75 g.     This information is not intended to replace advice given to you by your health care provider. Make sure you discuss any questions you have with your health care provider.     Document Released: 12/18/2006 Document Revised: 01/08/2016 Document Reviewed: 11/14/2014  [employer] Interactive Patient Education ©2017 [employer]

## 2017-09-20 NOTE — TELEPHONE ENCOUNTER
Pt's wife called and stated that she is concerned about pt because pt tends to get angry easily (he always has but seems to be getting worse), pt also seems to get confused easily and at times does not make sense. I informed pt's wife that we will need to make him appt and she said she would like to see what you say first.

## 2017-09-26 NOTE — TELEPHONE ENCOUNTER
Pt's wife informed that pt will need appt. States that she will talk to pt and let him know what is going on and her concerns about his memory and temper. Will call back to make appt.

## 2017-12-11 NOTE — PROGRESS NOTES
Chief Complaint   Patient presents with   • Follow-up     3 Month        Subjective     History of Present Illness   The pt is here today    The last visit with nephrology is 7/17 and pt states he did not need to go back.  The last order Lasix 40mg noon and spironolactone 25mg nonn stop K and restrcit fluids.    PT again did not bring meds with him. Unsure about meds that he is taking.    A1c 8.3 here today    He has swelling in legs no worse no better    He has heart doctor has not seen in months unsure when seen last.  Seen in 7/17 after a NS in 5/17 cardiology suggested HUNG eval pt declines.    Takes 50-70u diaily prn    Mild diastolic dysfiunction and cardiology and wanted him to have an echocardiogram of heart and pt again does nto know did know do this.     The following portions of the patient's history were reviewed and updated as appropriate: allergies, current medications, past family history, past medical history, past social history, past surgical history and problem list.        Current Outpatient Prescriptions:   •  ACCU-CHEK COMPACT PLUS test strip, Blood glucose twice daily, Disp: 100 each, Rfl: 0  •  aspirin  MG tablet, Take 1 tablet by mouth Daily., Disp: 90 tablet, Rfl: 1  •  atorvastatin (LIPITOR) 80 MG tablet, Take 1 tablet by mouth Every Night., Disp: 90 tablet, Rfl: 0  •  benazepril (LOTENSIN) 20 MG tablet, Take 1 tablet by mouth Daily., Disp: , Rfl:   •  Cholecalciferol (VITAMIN D3) 400 UNITS capsule, Take 400 Units by mouth Daily., Disp: , Rfl:   •  clopidogrel (PLAVIX) 75 MG tablet, Take 1 tablet by mouth Daily., Disp: 90 tablet, Rfl: 0  •  furosemide (LASIX) 40 MG tablet, Take 1 tablet by mouth Daily., Disp: 90 tablet, Rfl: 0  •  lansoprazole (PREVACID) 30 MG capsule, Take 1 capsule by mouth Daily., Disp: 90 capsule, Rfl: 0  •  LANTUS 100 UNIT/ML injection, Inject 70 Units under the skin Daily., Disp: 10 mL, Rfl: 0  •  QUEtiapine (SEROQUEL) 25 MG tablet, Take 1 tablet by mouth Every  Night., Disp: 90 tablet, Rfl: 0        Review of Systems   Constitutional: Negative for activity change, appetite change, chills, diaphoresis, fatigue and fever.   HENT: Positive for hearing loss. Negative for congestion, sinus pressure, sore throat and trouble swallowing.    Eyes: Negative.    Respiratory: Negative.    Cardiovascular: Positive for leg swelling. Negative for chest pain and palpitations.   Gastrointestinal: Negative for abdominal pain, constipation, nausea and vomiting.   Endocrine: Negative for polydipsia, polyphagia and polyuria.   Genitourinary: Negative for difficulty urinating, dysuria and flank pain.   Musculoskeletal: Negative for arthralgias.   Skin: Negative for rash.   Allergic/Immunologic: Negative for environmental allergies.   Neurological: Negative for dizziness and headaches.   Psychiatric/Behavioral: Negative for sleep disturbance.   All other systems reviewed and are negative.    Objective   Physical Exam   Constitutional: He is oriented to person, place, and time. He appears well-developed and well-nourished.   HENT:   Head: Normocephalic and atraumatic.   Right Ear: External ear normal.   Left Ear: External ear normal.   Nose: Nose normal.   Mouth/Throat: Oropharynx is clear and moist.   Eyes: Conjunctivae are normal.   Cardiovascular: Regular rhythm.    Systolic murmur   Pulmonary/Chest: Effort normal and breath sounds normal.   Abdominal: Soft. Bowel sounds are normal. He exhibits no distension. There is no tenderness.   Musculoskeletal: He exhibits edema.   2+ BLE   Lymphadenopathy:     He has no cervical adenopathy.   Neurological: He is alert and oriented to person, place, and time.   Skin: Skin is warm and dry.   Psychiatric: He has a normal mood and affect. His behavior is normal.   Nursing note and vitals reviewed.      Results for orders placed or performed in visit on 12/11/17   POC Urinalysis Dipstick, Automated   Result Value Ref Range    Color Yellow Yellow, Straw,  Dark Yellow, Gaby    Clarity, UA Clear Clear    Glucose, UA Negative Negative, 1000 mg/dL (3+) mg/dL    Bilirubin Negative Negative    Ketones, UA Negative Negative    Specific Gravity  1.015 1.005 - 1.030    Blood, UA Negative Negative    pH, Urine 5.0 5.0 - 8.0    Protein, POC Negative Negative mg/dL    Urobilinogen, UA Normal Normal    Leukocytes Negative Negative    Nitrite, UA Negative Negative    Lot Number [id number]     Expiration Date 6-30-18    POC Microalbumin   Result Value Ref Range    Microalbumin, Urine 30     Creatinine, Urine 50     A/C 30-300mg/g ABNORMAL     Lot Number [id number]     Expiration Date 11-30-18    POC Glycosylated Hemoglobin (Hb A1C)   Result Value Ref Range    Hemoglobin A1C 7.9 %    Lot Number [id number]     Expiration Date 6-19         Assessment/Plan   Damaso was seen today for follow-up.    Diagnoses and all orders for this visit:    Coronary artery disease involving native coronary artery of native heart without angina pectoris  -     CBC & Differential  -     Comprehensive Metabolic Panel  -     Helicobacter Pylori, IgA IgG IgM  -     POC Urinalysis Dipstick, Automated    Essential hypertension  -     CBC & Differential  -     Comprehensive Metabolic Panel  -     Helicobacter Pylori, IgA IgG IgM  -     POC Urinalysis Dipstick, Automated    Gastroesophageal reflux disease, esophagitis presence not specified  -     CBC & Differential  -     Comprehensive Metabolic Panel  -     Helicobacter Pylori, IgA IgG IgM  -     POC Urinalysis Dipstick, Automated    Vitamin D deficiency disease  -     CBC & Differential  -     Comprehensive Metabolic Panel  -     Helicobacter Pylori, IgA IgG IgM  -     POC Urinalysis Dipstick, Automated    Type 2 diabetes mellitus without complication, with long-term current use of insulin  -     CBC & Differential  -     Comprehensive Metabolic Panel  -     Helicobacter Pylori, IgA IgG IgM  -     POC Urinalysis Dipstick, Automated  -     POC Microalbumin  -      POC Glycosylated Hemoglobin (Hb A1C)    Chronic kidney disease, unspecified CKD stage  -     CBC & Differential  -     Comprehensive Metabolic Panel  -     Helicobacter Pylori, IgA IgG IgM  -     POC Urinalysis Dipstick, Automated    Dyslipidemia  -     CBC & Differential  -     Comprehensive Metabolic Panel  -     Helicobacter Pylori, IgA IgG IgM  -     POC Urinalysis Dipstick, Automated    Localized edema  -     CBC & Differential  -     Comprehensive Metabolic Panel  -     Helicobacter Pylori, IgA IgG IgM  -     POC Urinalysis Dipstick, Automated    Hx of CABG  -     CBC & Differential  -     Comprehensive Metabolic Panel  -     Helicobacter Pylori, IgA IgG IgM  -     POC Urinalysis Dipstick, Automated    Insomnia, unspecified type  -     CBC & Differential  -     Comprehensive Metabolic Panel  -     Helicobacter Pylori, IgA IgG IgM  -     POC Urinalysis Dipstick, Automated        A medicine reconciliation was done with patient's pharmacy the above list.  Patient has been picking up with the exception of cartia 240 mg.  He had been prescribed a 1 month supply starting on September 15.  In review of cardiology's last note there was a question whether he wanted him to stay on this medication but left that up to nephrology however patient did not keep nephrology appointment.    The pt again set up for cardiology and nephrology.    Discussed with patient in detail the importance that he follow-up with these appointments.  Cardiology notes reviewed from last visit was holding further changes in medication until nephrology consult was completed however patient felt good to this appointment.  I've let patient know that I will not follow him independent of specialist that he needs in lieu of the last year where he has been hospitalized for acute renal failure.  I will refill his medications one more month.  If he cannot keep his appointments with each of the specialist in regards to his care he will need to find a new  PCP.  As a chronic long-standing no-show records in regards to myself and other specialties.  RF meds one month if does not keep appt. Then will not further rf meds.       Return in about 3 months (around 3/11/2018) for Next scheduled follow up, fasting.  RTC/call  If symptoms worsen  Meds MOA and SE's reviewed and pt v/u

## 2017-12-11 NOTE — TELEPHONE ENCOUNTER
Mayelin can you set up pt for cardiology and nephrology (David) ([facility]) for f/u appt.  Pt has not kept appt.     Cheri can you postpone his H/m wellness, opth, tdap, flu

## 2017-12-15 NOTE — TELEPHONE ENCOUNTER
Patient wanted to wait on cardiology appt, per office they do not need him to follow up until July 2018. He will schedule himself later.    I scheduled nephrology appt on Monday dec 18th at 315. I called and informed pt and faxed office note and labs over to dr sood.

## 2018-01-01 ENCOUNTER — TELEPHONE (OUTPATIENT)
Dept: INTERNAL MEDICINE | Facility: CLINIC | Age: 78
End: 2018-01-01

## 2018-01-01 ENCOUNTER — HOSPITAL ENCOUNTER (OUTPATIENT)
Dept: GENERAL RADIOLOGY | Facility: HOSPITAL | Age: 78
Discharge: HOME | End: 2018-05-02
Admitting: NURSE PRACTITIONER

## 2018-01-01 ENCOUNTER — LAB REQUISITION (OUTPATIENT)
Dept: LAB | Facility: HOSPITAL | Age: 78
End: 2018-01-01

## 2018-01-01 ENCOUNTER — TELEPHONE (OUTPATIENT)
Dept: CARDIOLOGY | Facility: CLINIC | Age: 78
End: 2018-01-01

## 2018-01-01 ENCOUNTER — OFFICE VISIT (OUTPATIENT)
Dept: INTERNAL MEDICINE | Facility: CLINIC | Age: 78
End: 2018-01-01

## 2018-01-01 VITALS
WEIGHT: 260.2 LBS | DIASTOLIC BLOOD PRESSURE: 52 MMHG | BODY MASS INDEX: 39.43 KG/M2 | HEIGHT: 68 IN | SYSTOLIC BLOOD PRESSURE: 90 MMHG | HEART RATE: 90 BPM | TEMPERATURE: 97.4 F | RESPIRATION RATE: 20 BRPM

## 2018-01-01 DIAGNOSIS — Z79.4 TYPE 2 DIABETES MELLITUS WITHOUT COMPLICATION, WITH LONG-TERM CURRENT USE OF INSULIN (HCC): ICD-10-CM

## 2018-01-01 DIAGNOSIS — I25.10 CORONARY ARTERY DISEASE INVOLVING NATIVE CORONARY ARTERY OF NATIVE HEART WITHOUT ANGINA PECTORIS: ICD-10-CM

## 2018-01-01 DIAGNOSIS — I50.9 CONGESTIVE HEART FAILURE, UNSPECIFIED CONGESTIVE HEART FAILURE CHRONICITY, UNSPECIFIED CONGESTIVE HEART FAILURE TYPE: ICD-10-CM

## 2018-01-01 DIAGNOSIS — Z95.1 HX OF CABG: ICD-10-CM

## 2018-01-01 DIAGNOSIS — I10 ESSENTIAL HYPERTENSION: ICD-10-CM

## 2018-01-01 DIAGNOSIS — R60.0 LOCALIZED EDEMA: ICD-10-CM

## 2018-01-01 DIAGNOSIS — K21.9 GASTROESOPHAGEAL REFLUX DISEASE, ESOPHAGITIS PRESENCE NOT SPECIFIED: ICD-10-CM

## 2018-01-01 DIAGNOSIS — Z00.00 INITIAL MEDICARE ANNUAL WELLNESS VISIT: Primary | ICD-10-CM

## 2018-01-01 DIAGNOSIS — E11.9 TYPE 2 DIABETES MELLITUS WITHOUT COMPLICATION, WITH LONG-TERM CURRENT USE OF INSULIN (HCC): ICD-10-CM

## 2018-01-01 DIAGNOSIS — G47.00 INSOMNIA, UNSPECIFIED TYPE: ICD-10-CM

## 2018-01-01 DIAGNOSIS — E55.9 VITAMIN D DEFICIENCY DISEASE: ICD-10-CM

## 2018-01-01 DIAGNOSIS — N18.9 CHRONIC KIDNEY DISEASE, UNSPECIFIED CKD STAGE: Primary | ICD-10-CM

## 2018-01-01 DIAGNOSIS — R06.02 SOB (SHORTNESS OF BREATH): ICD-10-CM

## 2018-01-01 DIAGNOSIS — E78.5 DYSLIPIDEMIA: ICD-10-CM

## 2018-01-01 DIAGNOSIS — R26.81 GAIT INSTABILITY: ICD-10-CM

## 2018-01-01 DIAGNOSIS — Z00.00 ANNUAL PHYSICAL EXAM: ICD-10-CM

## 2018-01-01 DIAGNOSIS — Z00.00 ROUTINE GENERAL MEDICAL EXAMINATION AT A HEALTH CARE FACILITY: ICD-10-CM

## 2018-01-01 DIAGNOSIS — R05.9 COUGH: ICD-10-CM

## 2018-01-01 DIAGNOSIS — J40 BRONCHITIS: ICD-10-CM

## 2018-01-01 DIAGNOSIS — N18.9 CHRONIC KIDNEY DISEASE, UNSPECIFIED CKD STAGE: ICD-10-CM

## 2018-01-01 LAB
25(OH)D3 SERPL-MCNC: 34.9 NG/ML
25(OH)D3+25(OH)D2 SERPL-MCNC: 34.9 NG/ML
A/C: NORMAL
ALBUMIN SERPL-MCNC: 4.4 G/DL (ref 3.2–4.8)
ALBUMIN SERPL-MCNC: 4.4 G/DL (ref 3.2–4.8)
ALBUMIN/GLOB SERPL: 1.6 G/DL (ref 1.5–2.5)
ALBUMIN/GLOB SERPL: 1.6 G/DL (ref 1.5–2.5)
ALP SERPL-CCNC: 96 U/L (ref 25–100)
ALP SERPL-CCNC: 96 U/L (ref 25–100)
ALT SERPL W P-5'-P-CCNC: 27 U/L (ref 7–40)
ALT SERPL-CCNC: 27 U/L (ref 7–40)
ANION GAP SERPL CALCULATED.3IONS-SCNC: 11 MMOL/L (ref 3–11)
ARTICHOKE IGE QN: 70 MG/DL (ref 0–130)
AST SERPL-CCNC: 21 U/L (ref 0–33)
AST SERPL-CCNC: 21 U/L (ref 0–33)
BASOPHILS # BLD AUTO: 0.01 10*3/MM3 (ref 0–0.2)
BASOPHILS # BLD AUTO: 0.01 10E3/MM3 (ref 0–0.2)
BASOPHILS NFR BLD AUTO: 0.1 % (ref 0–1)
BASOPHILS NFR BLD AUTO: 0.1 % (ref 0–1)
BILIRUB BLD-MCNC: NEGATIVE MG/DL
BILIRUB SERPL-MCNC: 0.7 MG/DL (ref 0.3–1.2)
BILIRUB SERPL-MCNC: 0.7 MG/DL (ref 0.3–1.2)
BUN BLD-MCNC: 45 MG/DL (ref 9–23)
BUN SERPL-MCNC: 45 MG/DL (ref 9–23)
BUN/CREAT SERPL: 20.5 (ref 7–25)
BUN/CREAT SERPL: 20.5 (ref 7–25)
CALCIUM SERPL-MCNC: 9.1 MG/DL (ref 8.7–10.4)
CALCIUM SPEC-SCNC: 9.1 MG/DL (ref 8.7–10.4)
CHLORIDE SERPL-SCNC: 98 MMOL/L (ref 99–109)
CHLORIDE SERPL-SCNC: 98 MMOL/L (ref 99–109)
CHOLEST SERPL-MCNC: 143 MG/DL (ref 0–200)
CHOLEST SERPL-MCNC: 143 MG/DL (ref 0–200)
CLARITY, POC: CLEAR
CO2 SERPL-SCNC: 27 MMOL/L (ref 20–31)
CO2 SERPL-SCNC: 27 MMOL/L (ref 20–31)
COLOR UR: YELLOW
CREAT BLD-MCNC: 2.2 MG/DL (ref 0.6–1.3)
CREAT SERPL-MCNC: 2.2 MG/DL (ref 0.6–1.3)
DEPRECATED RDW RBC AUTO: 48.7 FL (ref 37–54)
EOSINOPHIL # BLD AUTO: 0.11 10*3/MM3 (ref 0–0.3)
EOSINOPHIL # BLD AUTO: 0.11 10E3/MM3 (ref 0.1–0.3)
EOSINOPHIL NFR BLD AUTO: 1 % (ref 0–3)
EOSINOPHIL NFR BLD AUTO: 1 % (ref 0–3)
ERYTHROCYTE [DISTWIDTH] IN BLOOD BY AUTOMATED COUNT: 16.7 % (ref 11.3–14.5)
ERYTHROCYTE [DISTWIDTH] IN BLOOD BY AUTOMATED COUNT: 16.7 % (ref 11.3–14.5)
EXPIRATION DATE: NORMAL
GFR SERPL CREATININE-BSD FRML MDRD: 29 ML/MIN/1.73
GFR SERPL CREATININE-BSD FRML MDRD: 35 ML/MIN/1.73
GFR SERPLBLD CREATININE-BSD FMLA CKD-EPI: 29 ML/MIN/1.73
GFR SERPLBLD CREATININE-BSD FMLA CKD-EPI: 35 ML/MIN/1.73
GLOBULIN SER CALC-MCNC: 2.7 G/DL
GLOBULIN UR ELPH-MCNC: 2.7 GM/DL
GLUCOSE BLD-MCNC: 142 MG/DL (ref 70–100)
GLUCOSE SERPL-MCNC: 142 MG/DL (ref 70–100)
GLUCOSE UR STRIP-MCNC: NEGATIVE MG/DL
HBA1C MFR BLD: 7.1 %
HCT VFR BLD AUTO: 40.6 % (ref 38.9–50.9)
HCT VFR BLD AUTO: 40.6 % (ref 38.9–50.9)
HDLC SERPL-MCNC: 32 MG/DL (ref 40–60)
HDLC SERPL-MCNC: 32 MG/DL (ref 40–60)
HGB BLD-MCNC: 12.6 G/DL (ref 13.1–17.5)
HGB BLD-MCNC: 12.6 G/DL (ref 13.1–17.5)
IMM GRANULOCYTES # BLD: 0.05 10*3/MM3 (ref 0–0.03)
IMM GRANULOCYTES # BLD: 0.05 10E3/MM3 (ref 0–0.03)
IMM GRANULOCYTES NFR BLD: 0.4 % (ref 0–0.6)
IMM GRANULOCYTES NFR BLD: 0.4 % (ref 0–0.6)
KETONES UR QL: NEGATIVE
LDLC SERPL CALC-MCNC: 70 MG/DL (ref 0–99)
LEUKOCYTE EST, POC: NEGATIVE
LYMPHOCYTES # BLD AUTO: 0.92 10*3/MM3 (ref 0.6–4.8)
LYMPHOCYTES # BLD AUTO: 0.92 10E3/MM3 (ref 0.6–4.8)
LYMPHOCYTES NFR BLD AUTO: 8.3 % (ref 24–44)
LYMPHOCYTES NFR BLD AUTO: 8.3 % (ref 24–44)
Lab: NORMAL
MCH RBC QN AUTO: 24.8 PG (ref 27–31)
MCH RBC QN AUTO: 24.8 PG (ref 27–31)
MCHC RBC AUTO-ENTMCNC: 31 G/DL (ref 32–36)
MCHC RBC AUTO-ENTMCNC: 31 G/DL (ref 32–36)
MCV RBC AUTO: 79.8 FL (ref 80–99)
MCV RBC AUTO: 79.8 FL (ref 80–99)
MONOCYTES # BLD AUTO: 0.63 10*3/MM3 (ref 0–1)
MONOCYTES # BLD AUTO: 0.63 10E3/MM3 (ref 0–1)
MONOCYTES NFR BLD AUTO: 5.7 % (ref 0–12)
MONOCYTES NFR BLD AUTO: 5.7 % (ref 0–12)
MORPHOLOGY BLD-IMP: (no result)
NEUTROPHILS # BLD AUTO: 9.46 10*3/MM3 (ref 1.5–8.3)
NEUTROPHILS # BLD AUTO: 9.46 10E3/MM3 (ref 1.5–8.3)
NEUTROPHILS NFR BLD AUTO: 84.9 % (ref 41–71)
NEUTROPHILS NFR BLD AUTO: 84.9 % (ref 41–71)
NITRITE UR-MCNC: NEGATIVE MG/ML
PH UR: 5 [PH] (ref 5–8)
PLAT MORPH BLD: NORMAL
PLATELET # BLD AUTO: 146 10*3/MM3 (ref 150–450)
PLATELET # BLD AUTO: 146 10E3/MM3 (ref 150–450)
POC CREATININE URINE: 100
POC MICROALBUMIN URINE: 10
POTASSIUM BLD-SCNC: 4.6 MMOL/L (ref 3.5–5.5)
POTASSIUM SERPL-SCNC: 4.6 MMOL/L (ref 3.5–5.5)
PROT SERPL-MCNC: 7.1 G/DL (ref 5.7–8.2)
PROT SERPL-MCNC: 7.1 G/DL (ref 5.7–8.2)
PROT UR STRIP-MCNC: NEGATIVE MG/DL
RBC # BLD AUTO: 5.09 10*6/MM3 (ref 4.2–5.76)
RBC # BLD AUTO: 5.09 10E6/MM3 (ref 4.2–5.76)
RBC # UR STRIP: NEGATIVE /UL
RBC MORPH BLD: NORMAL
SODIUM BLD-SCNC: 136 MMOL/L (ref 132–146)
SODIUM SERPL-SCNC: 136 MMOL/L (ref 132–146)
SP GR UR: 1.01 (ref 1–1.03)
TRIGL SERPL-MCNC: 241 MG/DL (ref 0–150)
TRIGL SERPL-MCNC: 241 MG/DL (ref 0–150)
UROBILINOGEN UR QL: NORMAL
VLDLC SERPL CALC-MCNC: 48 MG/DL
WBC # BLD AUTO: 11.13 10E3/MM3 (ref 3.5–10.8)
WBC MORPH BLD: NORMAL
WBC NRBC COR # BLD: 11.13 10*3/MM3 (ref 3.5–10.8)

## 2018-01-01 PROCEDURE — 96160 PT-FOCUSED HLTH RISK ASSMT: CPT | Performed by: NURSE PRACTITIONER

## 2018-01-01 PROCEDURE — 83036 HEMOGLOBIN GLYCOSYLATED A1C: CPT | Performed by: NURSE PRACTITIONER

## 2018-01-01 PROCEDURE — 85007 BL SMEAR W/DIFF WBC COUNT: CPT | Performed by: NURSE PRACTITIONER

## 2018-01-01 PROCEDURE — 80061 LIPID PANEL: CPT | Performed by: NURSE PRACTITIONER

## 2018-01-01 PROCEDURE — 85025 COMPLETE CBC W/AUTO DIFF WBC: CPT | Performed by: NURSE PRACTITIONER

## 2018-01-01 PROCEDURE — 80053 COMPREHEN METABOLIC PANEL: CPT | Performed by: NURSE PRACTITIONER

## 2018-01-01 PROCEDURE — G0438 PPPS, INITIAL VISIT: HCPCS | Performed by: NURSE PRACTITIONER

## 2018-01-01 PROCEDURE — 99397 PER PM REEVAL EST PAT 65+ YR: CPT | Performed by: NURSE PRACTITIONER

## 2018-01-01 PROCEDURE — 71046 X-RAY EXAM CHEST 2 VIEWS: CPT

## 2018-01-01 PROCEDURE — 82306 VITAMIN D 25 HYDROXY: CPT | Performed by: NURSE PRACTITIONER

## 2018-01-01 PROCEDURE — 82044 UR ALBUMIN SEMIQUANTITATIVE: CPT | Performed by: NURSE PRACTITIONER

## 2018-01-01 PROCEDURE — 81003 URINALYSIS AUTO W/O SCOPE: CPT | Performed by: NURSE PRACTITIONER

## 2018-01-01 RX ORDER — INSULIN GLARGINE 100 [IU]/ML
INJECTION, SOLUTION SUBCUTANEOUS
Qty: 2 EACH | Refills: 0 | Status: SHIPPED | OUTPATIENT
Start: 2018-01-01 | End: 2018-01-01 | Stop reason: SDUPTHER

## 2018-01-01 RX ORDER — CLOPIDOGREL BISULFATE 75 MG/1
TABLET ORAL
Qty: 90 TABLET | Refills: 0 | OUTPATIENT
Start: 2018-01-01

## 2018-01-01 RX ORDER — BLOOD SUGAR DIAGNOSTIC, DRUM
STRIP MISCELLANEOUS
Qty: 100 EACH | Refills: 2 | Status: SHIPPED | OUTPATIENT
Start: 2018-01-01

## 2018-01-01 RX ORDER — ATORVASTATIN CALCIUM 80 MG/1
TABLET, FILM COATED ORAL
Qty: 30 TABLET | Refills: 1 | OUTPATIENT
Start: 2018-01-01

## 2018-01-01 RX ORDER — CLOPIDOGREL BISULFATE 75 MG/1
TABLET ORAL
Qty: 90 TABLET | Refills: 0 | Status: SHIPPED | OUTPATIENT
Start: 2018-01-01 | End: 2018-01-01 | Stop reason: SDUPTHER

## 2018-01-01 RX ORDER — BENZONATATE 200 MG/1
200 CAPSULE ORAL 3 TIMES DAILY PRN
Qty: 30 CAPSULE | Refills: 0 | Status: SHIPPED | OUTPATIENT
Start: 2018-01-01

## 2018-01-01 RX ORDER — ATORVASTATIN CALCIUM 80 MG/1
TABLET, FILM COATED ORAL
Qty: 14 TABLET | Refills: 0 | Status: SHIPPED | OUTPATIENT
Start: 2018-01-01 | End: 2018-01-01 | Stop reason: SDUPTHER

## 2018-01-01 RX ORDER — FLUTICASONE PROPIONATE 50 MCG
2 SPRAY, SUSPENSION (ML) NASAL DAILY
Qty: 1 BOTTLE | Refills: 2 | Status: SHIPPED | OUTPATIENT
Start: 2018-01-01

## 2018-01-01 RX ORDER — CLOPIDOGREL BISULFATE 75 MG/1
TABLET ORAL
Qty: 90 TABLET | Refills: 0 | Status: SHIPPED | OUTPATIENT
Start: 2018-01-01

## 2018-01-01 RX ORDER — INSULIN GLARGINE 100 [IU]/ML
INJECTION, SOLUTION SUBCUTANEOUS
Qty: 30 ML | Refills: 1 | Status: SHIPPED | OUTPATIENT
Start: 2018-01-01 | End: 2018-01-01 | Stop reason: SDUPTHER

## 2018-01-01 RX ORDER — AZITHROMYCIN 250 MG/1
TABLET, FILM COATED ORAL
Qty: 6 TABLET | Refills: 0 | Status: SHIPPED | OUTPATIENT
Start: 2018-01-01

## 2018-01-01 RX ORDER — BENAZEPRIL HYDROCHLORIDE 20 MG/1
TABLET ORAL
Qty: 90 TABLET | Refills: 1 | Status: SHIPPED | OUTPATIENT
Start: 2018-01-01

## 2018-01-01 RX ORDER — QUETIAPINE FUMARATE 25 MG/1
TABLET, FILM COATED ORAL
Qty: 90 TABLET | Refills: 0 | Status: SHIPPED | OUTPATIENT
Start: 2018-01-01

## 2018-01-01 RX ORDER — INSULIN GLARGINE 100 [IU]/ML
INJECTION, SOLUTION SUBCUTANEOUS
Qty: 2 EACH | Refills: 0 | Status: SHIPPED | OUTPATIENT
Start: 2018-01-01

## 2018-01-01 RX ORDER — INSULIN GLARGINE 100 [IU]/ML
INJECTION, SOLUTION SUBCUTANEOUS
Qty: 10 ML | Refills: 0 | Status: SHIPPED | OUTPATIENT
Start: 2018-01-01 | End: 2018-01-01 | Stop reason: SDUPTHER

## 2018-01-01 RX ORDER — ATORVASTATIN CALCIUM 80 MG/1
TABLET, FILM COATED ORAL
Qty: 30 TABLET | Refills: 2 | Status: SHIPPED | OUTPATIENT
Start: 2018-01-01

## 2018-01-01 RX ORDER — BLOOD SUGAR DIAGNOSTIC, DRUM
STRIP MISCELLANEOUS
Qty: 102 EACH | Refills: 0 | Status: SHIPPED | OUTPATIENT
Start: 2018-01-01 | End: 2018-01-01 | Stop reason: SDUPTHER

## 2018-01-01 RX ORDER — INSULIN GLARGINE 100 [IU]/ML
INJECTION, SOLUTION SUBCUTANEOUS
Qty: 1 EACH | Refills: 0 | Status: SHIPPED | OUTPATIENT
Start: 2018-01-01 | End: 2018-01-01

## 2018-01-23 NOTE — TELEPHONE ENCOUNTER
Patient prescription was sent for one vial and pharmacy says that the copay is the same whether it is one or two vials. Thank you.

## 2018-01-23 NOTE — TELEPHONE ENCOUNTER
Ok per YULISSA Remy to dispense 3 vials at a time (each vial is 10ml) with 1 refill and asked to call patient to verify he will not take more than instructed on vials.    Notified patient of this, verbal understanding given.  Script sent in to pharmacy.

## 2018-04-20 NOTE — TELEPHONE ENCOUNTER
Patient's wife notified, verbal understanding given.  Appointment rescheduled for May 2, 2018. Refill for atorvastatin sent in.

## 2018-04-20 NOTE — TELEPHONE ENCOUNTER
Tana per Lisseth's request can you set it up so patient's minimum time for an appointment is a 30 minute time slot?

## 2018-04-20 NOTE — TELEPHONE ENCOUNTER
Could pt be moved to 1st or 2nd week of may if possible if so rf med until that time then I will see in 30 minute.  Please have front came a permanent 30 minute slot.  If he cannot do so please keep him in this spot.

## 2018-04-30 NOTE — TELEPHONE ENCOUNTER
Patient's pharmacy requesting refill of of Lantus. Please advise.   -Pt last seen  12/11/2017  -Next appointment 5/2/2018  -Last rx  3/30/2018, #1 with 0 refills

## 2018-04-30 NOTE — TELEPHONE ENCOUNTER
Pharmacy states that patients get the vials and that they fill two at a time because patient's copay is the same whether he picks up one vial or two vials.  Pharmacists states that they still have a prescription on file for one vial but that patient will be unhappy if one for two vials is not sent in as the copay is the same.

## 2018-04-30 NOTE — TELEPHONE ENCOUNTER
Please call pharmacy and see if he has any of this medicine at this time?  EC if patient is using when necessary or bottle/vial.

## 2018-04-30 NOTE — TELEPHONE ENCOUNTER
Please call patient let him know that I will send him to vials of his insulin however if he has not kept appointment by the end of this month and I will need to talk with him up from a compliance standpoint seeking another primary care physician if he is unable to keep regular appointments.

## 2018-05-02 NOTE — PROGRESS NOTES
QUICK REFERENCE INFORMATION:  The ABCs of the Annual Wellness Visit    Initial Medicare Wellness Visit    HEALTH RISK ASSESSMENT    1940    Recent Hospitalizations:  No hospitalization(s) within the last year..        Current Medical Providers:  Patient Care Team:  YULISSA Tang as PCP - General (Internal Medicine)        Smoking Status:  History   Smoking Status   • Former Smoker   • Packs/day: 3.00   • Years: 40.00   • Types: Cigarettes   • Quit date: 1/1/1998   Smokeless Tobacco   • Never Used       Alcohol Consumption:  History   Alcohol Use No       Depression Screen:   PHQ-2/PHQ-9 Depression Screening 5/2/2018   Little interest or pleasure in doing things 3   Feeling down, depressed, or hopeless 3   Trouble falling or staying asleep, or sleeping too much 0   Feeling tired or having little energy 0   Poor appetite or overeating 0   Feeling bad about yourself - or that you are a failure or have let yourself or your family down 3   Trouble concentrating on things, such as reading the newspaper or watching television 0   Moving or speaking so slowly that other people could have noticed. Or the opposite - being so fidgety or restless that you have been moving around a lot more than usual 2   Thoughts that you would be better off dead, or of hurting yourself in some way 0   Total Score 11   If you checked off any problems, how difficult have these problems made it for you to do your work, take care of things at home, or get along with other people? Somewhat difficult       Health Habits and Functional and Cognitive Screening:  Functional & Cognitive Status 5/2/2018   Do you have difficulty preparing food and eating? Yes   Do you have difficulty bathing yourself, getting dressed or grooming yourself? No   Do you have difficulty using the toilet? No   Do you have difficulty moving around from place to place? Yes   Do you have trouble with steps or getting out of a bed or a chair? Yes   In the past year have  you fallen or experienced a near fall? No   Current Diet Unhealthy Diet   Dental Exam Not up to date   Eye Exam Up to date   Exercise (times per week) 0 times per week   Current Exercise Activities Include None   Do you need help using the phone?  No   Are you deaf or do you have serious difficulty hearing?  No   Do you need help with transportation? Yes   Do you need help shopping? Yes   Do you need help preparing meals?  Yes   Do you need help with housework?  Yes   Do you need help with laundry? Yes   Do you need help taking your medications? No   Do you need help managing money? Yes   Do you ever drive or ride in a car without wearing a seat belt? No   Have you felt unusual stress, anger or loneliness in the last month? Yes   Who do you live with? Spouse   If you need help, do you have trouble finding someone available to you? No   Have you been bothered in the last four weeks by sexual problems? No   Do you have difficulty concentrating, remembering or making decisions? No           Does the patient have evidence of cognitive impairment? No    Asiprin use counseling: Does not need ASA (and currently is not on it)      Recent Lab Results:    Visual Acuity:  No exam data present    Age-appropriate Screening Schedule:  Refer to the list below for future screening recommendations based on patient's age, sex and/or medical conditions. Orders for these recommended tests are listed in the plan section. The patient has been provided with a written plan.    Health Maintenance   Topic Date Due   • DIABETIC EYE EXAM  1940   • INFLUENZA VACCINE  09/03/2018 (Originally 8/1/2018)   • TDAP/TD VACCINES (1 - Tdap) 01/01/2019 (Originally 1/2/1959)   • HEMOGLOBIN A1C  06/11/2018   • PNEUMOCOCCAL VACCINES (65+ LOW/MEDIUM RISK) (2 of 2 - PPSV23) 07/21/2018   • DIABETIC FOOT EXAM  09/21/2018   • URINE MICROALBUMIN  12/11/2018   • ZOSTER VACCINE  Completed        Subjective   History of Present Illness    Damaso Ivan is a  78 y.o. male who presents for an Annual Wellness Visit.    The following portions of the patient's history were reviewed and updated as appropriate: allergies, current medications, past family history, past medical history, past social history, past surgical history and problem list.    Outpatient Medications Prior to Visit   Medication Sig Dispense Refill   • ACCU-CHEK COMPACT PLUS test strip TEST TWO TIMES A  each 0   • aspirin  MG tablet TAKE ONE TABLET BY MOUTH DAILY 90 tablet 1   • atorvastatin (LIPITOR) 80 MG tablet Take one tablet by mouth once nightly, must come for appointment for further refills. 14 tablet 0   • benazepril (LOTENSIN) 20 MG tablet Take 1 tablet by mouth Daily.     • Cholecalciferol (VITAMIN D3) 400 UNITS capsule Take 400 Units by mouth Daily.     • clopidogrel (PLAVIX) 75 MG tablet TAKE ONE TABLET BY MOUTH DAILY 90 tablet 0   • furosemide (LASIX) 40 MG tablet Take 1 tablet by mouth Daily. 90 tablet 0   • lansoprazole (PREVACID) 30 MG capsule Take 1 capsule by mouth Daily. 90 capsule 0   • LANTUS 100 UNIT/ML injection INJECT 70 UNITS UNDER THE SKIN ONCE DAILY 1 each 0   • LANTUS 100 UNIT/ML injection INJECT 70 UNITS UNDER THE SKIN ONCE DAILY 2 each 0   • QUEtiapine (SEROquel) 25 MG tablet TAKE ONE TABLET BY MOUTH ONCE NIGHTLY 90 tablet 1   • aspirin  MG tablet Take 1 tablet by mouth Daily. 90 tablet 1     No facility-administered medications prior to visit.        Patient Active Problem List   Diagnosis   • CKD (chronic kidney disease)   • Coronary artery disease involving native coronary artery of native heart without angina pectoris   • Essential hypertension   • Dyslipidemia   • Type 2 diabetes mellitus without complication, with long-term current use of insulin   • Localized edema   • Gastroesophageal reflux disease   • Vitamin D deficiency disease   • Hx of CABG   • Insomnia       Advance Care Planning:  has NO advance directive - information provided to the patient  "today    Identification of Risk Factors:  Risk factors include: {; WELLNESS RISK FACTORS:26261}.    Review of Systems    Compared to one year ago, the patient feels his physical health is the same.  Compared to one year ago, the patient feels his mental health is the same.  BUt says he is very tempermental.+++declines treatement      Objective     Physical Exam    Vitals:    05/02/18 1329   BP: 90/52   BP Location: Right arm   Patient Position: Sitting   Cuff Size: Large Adult   Pulse: 90   Resp: 20   Temp: 97.4 °F (36.3 °C)   TempSrc: Temporal Artery    Weight: 118 kg (260 lb 3.2 oz)   Height: 171.5 cm (67.5\")   PainSc: 0-No pain       Patient's Body mass index is 40.15 kg/m². BMI is {BMI range:36245}.      Assessment/Plan   Patient Self-Management and Personalized Health Advice  The patient has been provided with information about: {; PERSONALIZED HEALTH ADVICE:85122} and preventive services including:   · {plan:15990}.    Visit Diagnoses:  No diagnosis found.    No orders of the defined types were placed in this encounter.      Outpatient Encounter Prescriptions as of 5/2/2018   Medication Sig Dispense Refill   • ACCU-CHEK COMPACT PLUS test strip TEST TWO TIMES A  each 0   • aspirin  MG tablet TAKE ONE TABLET BY MOUTH DAILY 90 tablet 1   • atorvastatin (LIPITOR) 80 MG tablet Take one tablet by mouth once nightly, must come for appointment for further refills. 14 tablet 0   • benazepril (LOTENSIN) 20 MG tablet Take 1 tablet by mouth Daily.     • Cholecalciferol (VITAMIN D3) 400 UNITS capsule Take 400 Units by mouth Daily.     • clopidogrel (PLAVIX) 75 MG tablet TAKE ONE TABLET BY MOUTH DAILY 90 tablet 0   • furosemide (LASIX) 40 MG tablet Take 1 tablet by mouth Daily. 90 tablet 0   • lansoprazole (PREVACID) 30 MG capsule Take 1 capsule by mouth Daily. 90 capsule 0   • LANTUS 100 UNIT/ML injection INJECT 70 UNITS UNDER THE SKIN ONCE DAILY 1 each 0   • LANTUS 100 UNIT/ML injection INJECT 70 UNITS " UNDER THE SKIN ONCE DAILY 2 each 0   • QUEtiapine (SEROquel) 25 MG tablet TAKE ONE TABLET BY MOUTH ONCE NIGHTLY 90 tablet 1   • [DISCONTINUED] aspirin  MG tablet Take 1 tablet by mouth Daily. 90 tablet 1     No facility-administered encounter medications on file as of 5/2/2018.        Reviewed use of high risk medication in the elderly: {Response; yes/no/na:63}  Reviewed for potential of harmful drug interactions in the elderly: {Response; yes/no/na:63}    Follow Up:  Return in about 3 months (around 8/2/2018) for obtain Dr Morales last note, fasting, Next scheduled follow up.     An After Visit Summary and PPPS with all of these plans were given to the patient.

## 2018-05-02 NOTE — PROGRESS NOTES
"Annual exam    Subjective     History of Present Illness   The patient is here today for chronic follow-up.    As pertains to his coronary artery disease and past medical history of CABG.  The patient has cardiologist however is not seemingly sometimes.  There is some discussion in the right wrist when the last time the patient was seen as both he and his wife are not sure of his last appointment time.  He reports he didn't think he needed to go back to his cardiologist.    Hypertension  Blood pressure checks at home -none  Plan as panel 20 mg daily for hypertension    Edema in legs and takes Lasix 40 mg daily  Hyperlipidemia Lipitor at night       Takes Plavix 25 mg daily as well as aspirin 325 mg daily    Diabetes  70u qpm and 30-40 qam when he finds his BG elevated  Hypos 63 yesterday sometime goes to low avg  highest 150  Lantus use  Last A1c 7.9%    The kidney disease last appointment with Dr. Hernandez they are unsure when they have been there last will need to obtain records.  Also seen cardiology same day.    Vitamin D deficiency  Aching supplements    GERD    History of insomnia and takes Seroquel at times wife reports he gets very angry with her.    Per pt wife \"we ignore our appt's.\"    Does not get up due to pain in feet due to swelling.      The following portions of the patient's history were reviewed and updated as appropriate: allergies, current medications, past family history, past medical history, past social history, past surgical history and problem list.    Review of Systems   Constitutional: Negative for activity change, appetite change, chills, fatigue and fever.   HENT: Negative for congestion, postnasal drip and sore throat.    Eyes: Negative for visual disturbance.   Respiratory: Positive for cough. Negative for shortness of breath.    Cardiovascular: Negative for chest pain and palpitations.   Gastrointestinal: Negative for abdominal pain, constipation, diarrhea, nausea and GERD. "   Musculoskeletal: Negative for arthralgias and myalgias.   Skin: Negative for rash.   Allergic/Immunologic: Negative for environmental allergies.   Neurological: Negative for dizziness.   Psychiatric/Behavioral: Negative for sleep disturbance.   All other systems reviewed and are negative.      Objective   Physical Exam   Constitutional: He is oriented to person, place, and time. He appears well-developed and well-nourished.   HENT:   Head: Normocephalic and atraumatic.   Right Ear: External ear normal.   Left Ear: External ear normal.   Nose: Nose normal.   Mouth/Throat: Oropharynx is clear and moist.   Neck: No thyromegaly present.   Cardiovascular: Normal rate and regular rhythm.    Pulmonary/Chest: Effort normal. He has rales.   Crackles in bases   Abdominal: Soft. Bowel sounds are normal.   Musculoskeletal: He exhibits edema.   Edema mid calf 1+ to feet   Lymphadenopathy:     He has no cervical adenopathy.   Neurological: He is alert and oriented to person, place, and time.   Skin: Skin is warm and dry. Capillary refill takes 2 to 3 seconds.   Psychiatric: He has a normal mood and affect. His behavior is normal.   Nursing note and vitals reviewed.          Results for orders placed or performed in visit on 05/02/18   POC Urinalysis Dipstick, Automated   Result Value Ref Range    Color Yellow Yellow, Straw, Dark Yellow, Gaby    Clarity, UA Clear Clear    Glucose, UA Negative Negative, 1000 mg/dL (3+) mg/dL    Bilirubin Negative Negative    Ketones, UA Negative Negative    Specific Gravity  1.010 1.005 - 1.030    Blood, UA Negative Negative    pH, Urine 5.0 5.0 - 8.0    Protein, POC Negative Negative mg/dL    Urobilinogen, UA Normal Normal    Leukocytes Negative Negative    Nitrite, UA Negative Negative    Lot Number [id number]     Expiration Date 1-31-19    POC Glycosylated Hemoglobin (Hb A1C)   Result Value Ref Range    Hemoglobin A1C 7.1 %    Lot Number [id number]     Expiration Date 11-19    POC  Microalbumin   Result Value Ref Range    Microalbumin, Urine 10     Creatinine, Urine 100     A/C <30mg/g NORMAL     Lot Number [id number]     Expiration Date 3-31-19         Assessment/Plan   Diagnoses and all orders for this visit:    Initial Medicare annual wellness visit    Type 2 diabetes mellitus without complication, with long-term current use of insulin  -     Comprehensive Metabolic Panel  -     CBC & Differential  -     POC Glycosylated Hemoglobin (Hb A1C)  -     POC Microalbumin  -     Ambulatory Referral to Home Health    Chronic kidney disease, unspecified CKD stage  -     POC Urinalysis Dipstick, Automated  -     Ambulatory Referral to Home Health    Dyslipidemia  -     Lipid Panel    Localized edema  -     XR Chest PA & Lateral; Future  -     Ambulatory Referral to Home Health    Gastroesophageal reflux disease, esophagitis presence not specified    Vitamin D deficiency disease  -     Vitamin D 25 Hydroxy    Coronary artery disease involving native coronary artery of native heart without angina pectoris    Essential hypertension  -     Ambulatory Referral to Home Health    SOB (shortness of breath)  -     XR Chest PA & Lateral; Future    Cough  -     XR Chest PA & Lateral; Future  -     fluticasone (FLONASE) 50 MCG/ACT nasal spray; 2 sprays into each nostril Daily.    Bronchitis  -     azithromycin (ZITHROMAX Z-INDY) 250 MG tablet; Take 2 tablets the first day, then 1 tablet daily for 4 days.  -     benzonatate (TESSALON) 200 MG capsule; Take 1 capsule by mouth 3 (Three) Times a Day As Needed for Cough.    Gait instability  -     Ambulatory Referral to Home Health    Congestive heart failure, unspecified congestive heart failure chronicity, unspecified congestive heart failure type  -     Ambulatory Referral to Home Health    Annual physical exam    Chest x-ray notes increased markings/ enlarged heart- however will cover for bronchitis as he's had cough for some time..Flonase for allergies.   cxr for  edema and echo and reorder cardio and check about indigo appt and opthal appt.  We will call formal report the patient along with creatinine.-If creatinine stable will have him take Lasix twice a day for 3 days.     HH for meds, dm ed, PT for gait and strengthening, home safety evaluation and medication compliance.  It also like to follow-up on recent CHF.    Again long discussion regards to noncompliance of keeping regular appointments as well as with his specialist.    Declines colonoscopy declines rectal exam.  Has had a history of rectal cancer surgery as he's just informed me today and did have radiation he thinks.  He and his wife are having some discussion as to whether he did have surgery or just radiation.       Return in about 3 months (around 8/2/2018) for obtain Dr Morales last note, fasting, Next scheduled follow up.  RTC/call  If symptoms worsen  Meds MOA and SE's reviewed and pt v/u

## 2018-05-03 NOTE — PROGRESS NOTES
QUICK REFERENCE INFORMATION:  The ABCs of the Annual Wellness Visit    Initial Medicare Wellness Visit    HEALTH RISK ASSESSMENT    1940    Recent Hospitalizations:  No hospitalization(s) within the last year..        Current Medical Providers:  Patient Care Team:  YULISSA Tang as PCP - General (Internal Medicine)        Smoking Status:  History   Smoking Status   • Former Smoker   • Packs/day: 3.00   • Years: 40.00   • Types: Cigarettes   • Quit date: 1/1/1998   Smokeless Tobacco   • Never Used       Alcohol Consumption:  History   Alcohol Use No       Depression Screen:   PHQ-2/PHQ-9 Depression Screening 5/2/2018   Little interest or pleasure in doing things 3   Feeling down, depressed, or hopeless 3   Trouble falling or staying asleep, or sleeping too much 0   Feeling tired or having little energy 0   Poor appetite or overeating 0   Feeling bad about yourself - or that you are a failure or have let yourself or your family down 3   Trouble concentrating on things, such as reading the newspaper or watching television 0   Moving or speaking so slowly that other people could have noticed. Or the opposite - being so fidgety or restless that you have been moving around a lot more than usual 2   Thoughts that you would be better off dead, or of hurting yourself in some way 0   Total Score 11   If you checked off any problems, how difficult have these problems made it for you to do your work, take care of things at home, or get along with other people? Somewhat difficult       Health Habits and Functional and Cognitive Screening:  Functional & Cognitive Status 5/2/2018   Do you have difficulty preparing food and eating? Yes   Do you have difficulty bathing yourself, getting dressed or grooming yourself? No   Do you have difficulty using the toilet? No   Do you have difficulty moving around from place to place? Yes   Do you have trouble with steps or getting out of a bed or a chair? Yes   In the past year have  you fallen or experienced a near fall? No   Current Diet Unhealthy Diet   Dental Exam Not up to date   Eye Exam Up to date   Exercise (times per week) 0 times per week   Current Exercise Activities Include None   Do you need help using the phone?  No   Are you deaf or do you have serious difficulty hearing?  No   Do you need help with transportation? Yes   Do you need help shopping? Yes   Do you need help preparing meals?  Yes   Do you need help with housework?  Yes   Do you need help with laundry? Yes   Do you need help taking your medications? No   Do you need help managing money? Yes   Do you ever drive or ride in a car without wearing a seat belt? No   Have you felt unusual stress, anger or loneliness in the last month? Yes   Who do you live with? Spouse   If you need help, do you have trouble finding someone available to you? No   Have you been bothered in the last four weeks by sexual problems? No   Do you have difficulty concentrating, remembering or making decisions? No           Does the patient have evidence of cognitive impairment? No    Asiprin use counseling: Taking ASA appropriately as indicated  Reviewed risk of aspirin including gastritis and gastrointestinal bleed.  Symptoms could include coffee ground emesis, dark tarry stools, and abdominal pain.  If symptoms occur was advised to contact the office.  Advised to take encteric coated  aspirin with food.       Recent Lab Results:    Visual Acuity:  No exam data present    Age-appropriate Screening Schedule:  Refer to the list below for future screening recommendations based on patient's age, sex and/or medical conditions. Orders for these recommended tests are listed in the plan section. The patient has been provided with a written plan.    Health Maintenance   Topic Date Due   • DIABETIC EYE EXAM  1940   • INFLUENZA VACCINE  09/03/2018 (Originally 8/1/2018)   • TDAP/TD VACCINES (1 - Tdap) 01/01/2019 (Originally 1/2/1959)   • PNEUMOCOCCAL  VACCINES (65+ LOW/MEDIUM RISK) (2 of 2 - PPSV23) 07/21/2018   • DIABETIC FOOT EXAM  09/21/2018   • HEMOGLOBIN A1C  11/02/2018   • URINE MICROALBUMIN  05/02/2019   • ZOSTER VACCINE  Completed        Subjective   History of Present Illness    Damaso Ivan is a 78 y.o. male who presents for an Annual Wellness Visit.    The following portions of the patient's history were reviewed and updated as appropriate: allergies, current medications, past family history, past medical history, past social history, past surgical history and problem list.    Outpatient Medications Prior to Visit   Medication Sig Dispense Refill   • ACCU-CHEK COMPACT PLUS test strip TEST TWO TIMES A  each 0   • aspirin  MG tablet TAKE ONE TABLET BY MOUTH DAILY 90 tablet 1   • atorvastatin (LIPITOR) 80 MG tablet Take one tablet by mouth once nightly, must come for appointment for further refills. 14 tablet 0   • benazepril (LOTENSIN) 20 MG tablet Take 1 tablet by mouth Daily.     • Cholecalciferol (VITAMIN D3) 400 UNITS capsule Take 400 Units by mouth Daily.     • clopidogrel (PLAVIX) 75 MG tablet TAKE ONE TABLET BY MOUTH DAILY 90 tablet 0   • furosemide (LASIX) 40 MG tablet Take 1 tablet by mouth Daily. 90 tablet 0   • lansoprazole (PREVACID) 30 MG capsule Take 1 capsule by mouth Daily. 90 capsule 0   • LANTUS 100 UNIT/ML injection INJECT 70 UNITS UNDER THE SKIN ONCE DAILY 1 each 0   • LANTUS 100 UNIT/ML injection INJECT 70 UNITS UNDER THE SKIN ONCE DAILY 2 each 0   • QUEtiapine (SEROquel) 25 MG tablet TAKE ONE TABLET BY MOUTH ONCE NIGHTLY 90 tablet 1   • aspirin  MG tablet Take 1 tablet by mouth Daily. 90 tablet 1     No facility-administered medications prior to visit.        Patient Active Problem List   Diagnosis   • CKD (chronic kidney disease)   • Coronary artery disease involving native coronary artery of native heart without angina pectoris   • Essential hypertension   • Dyslipidemia   • Type 2 diabetes mellitus without  "complication, with long-term current use of insulin   • Localized edema   • Gastroesophageal reflux disease   • Vitamin D deficiency disease   • Hx of CABG   • Insomnia       Advance Care Planning:  has NO advance directive - information provided to the patient today    Identification of Risk Factors:  Risk factors include: weight , unhealthy diet, cardiovascular risk, increased fall risk, lack of transportation, caretaker stress, depression and financial stress.    Review of Systems    Compared to one year ago, the patient feels his physical health is worse.  Compared to one year ago, the patient feels his mental health is the same.    Objective     Physical Exam    Vitals:    05/02/18 1329   BP: 90/52   BP Location: Right arm   Patient Position: Sitting   Cuff Size: Large Adult   Pulse: 90   Resp: 20   Temp: 97.4 °F (36.3 °C)   TempSrc: Temporal Artery    Weight: 118 kg (260 lb 3.2 oz)   Height: 171.5 cm (67.5\")   PainSc: 0-No pain       Patient's Body mass index is 40.15 kg/m². BMI is above normal parameters. Follow-up plan includes:  educational material, exercise counseling, nutrition counseling, pharmacological intervention and Home health consult for meducation reconciliation home safety, diet, monitoring his congestive failure and physical therapy for gait instability.      Assessment/Plan   Patient Self-Management and Personalized Health Advice  The patient has been provided with information about: diet, exercise, weight management, prevention of cardiac or vascular disease, fall prevention, designing advance directives, supplements, mental health concerns and The wife is concerned in regards to his angry outburst however patient declines any psychiatric counseling or consultation and preventive services including:   · Fall Risk assessment done, Fall Risk plan of care done.    Visit Diagnoses:    ICD-10-CM ICD-9-CM   1. Initial Medicare annual wellness visit Z00.00 V70.0   2. Type 2 diabetes mellitus " without complication, with long-term current use of insulin E11.9 250.00    Z79.4 V58.67   3. Chronic kidney disease, unspecified CKD stage N18.9 585.9   4. Dyslipidemia E78.5 272.4   5. Localized edema R60.0 782.3   6. Gastroesophageal reflux disease, esophagitis presence not specified K21.9 530.81   7. Vitamin D deficiency disease E55.9 268.9   8. Coronary artery disease involving native coronary artery of native heart without angina pectoris I25.10 414.01   9. Essential hypertension I10 401.9   10. SOB (shortness of breath) R06.02 786.05   11. Cough R05 786.2   12. Bronchitis J40 490   13. Gait instability R26.81 781.2   14. Congestive heart failure, unspecified congestive heart failure chronicity, unspecified congestive heart failure type I50.9 428.0   15. Annual physical exam Z00.00 V70.0       Orders Placed This Encounter   Procedures   • XR Chest PA & Lateral     Standing Status:   Future     Number of Occurrences:   1     Standing Expiration Date:   5/2/2019     Order Specific Question:   Reason for Exam:     Answer:   edema cough sob   • Comprehensive Metabolic Panel   • Lipid Panel   • Vitamin D 25 Hydroxy   • Ambulatory Referral to Home Health     Referral Priority:   Routine     Referral Type:   Home Health     Referral Reason:   Specialty Services Required     Requested Specialty:   Home Health Services     Number of Visits Requested:   1   • POC Urinalysis Dipstick, Automated   • POC Glycosylated Hemoglobin (Hb A1C)   • POC Microalbumin   • CBC & Differential     Order Specific Question:   Manual Differential     Answer:   No       Outpatient Encounter Prescriptions as of 5/2/2018   Medication Sig Dispense Refill   • ACCU-CHEK COMPACT PLUS test strip TEST TWO TIMES A  each 0   • aspirin  MG tablet TAKE ONE TABLET BY MOUTH DAILY 90 tablet 1   • atorvastatin (LIPITOR) 80 MG tablet Take one tablet by mouth once nightly, must come for appointment for further refills. 14 tablet 0   • benazepril  (LOTENSIN) 20 MG tablet Take 1 tablet by mouth Daily.     • Cholecalciferol (VITAMIN D3) 400 UNITS capsule Take 400 Units by mouth Daily.     • clopidogrel (PLAVIX) 75 MG tablet TAKE ONE TABLET BY MOUTH DAILY 90 tablet 0   • furosemide (LASIX) 40 MG tablet Take 1 tablet by mouth Daily. 90 tablet 0   • lansoprazole (PREVACID) 30 MG capsule Take 1 capsule by mouth Daily. 90 capsule 0   • LANTUS 100 UNIT/ML injection INJECT 70 UNITS UNDER THE SKIN ONCE DAILY 1 each 0   • LANTUS 100 UNIT/ML injection INJECT 70 UNITS UNDER THE SKIN ONCE DAILY 2 each 0   • QUEtiapine (SEROquel) 25 MG tablet TAKE ONE TABLET BY MOUTH ONCE NIGHTLY 90 tablet 1   • azithromycin (ZITHROMAX Z-INDY) 250 MG tablet Take 2 tablets the first day, then 1 tablet daily for 4 days. 6 tablet 0   • benzonatate (TESSALON) 200 MG capsule Take 1 capsule by mouth 3 (Three) Times a Day As Needed for Cough. 30 capsule 0   • fluticasone (FLONASE) 50 MCG/ACT nasal spray 2 sprays into each nostril Daily. 1 bottle 2   • [DISCONTINUED] aspirin  MG tablet Take 1 tablet by mouth Daily. 90 tablet 1     No facility-administered encounter medications on file as of 5/2/2018.        Reviewed use of high risk medication in the elderly: yes  Reviewed for potential of harmful drug interactions in the elderly: yes    Follow Up:  Return in about 3 months (around 8/2/2018) for obtain Dr Morales last note, fasting, Next scheduled follow up.     An After Visit Summary and PPPS with all of these plans were given to the patient.

## 2018-05-03 NOTE — PATIENT INSTRUCTIONS
Medicare Wellness  Personal Prevention Plan of Service     Date of Office Visit:  2018  Encounter Provider:  YULISSA Lora  Place of Service:  [facility]  Patient Name: Damaso Ivan  :  1940    As part of the Medicare Wellness portion of your visit today, we are providing you with this personalized preventive plan of services (PPPS). This plan is based upon recommendations of the [place] Preventive Services Task Force (USPSTF) and the Advisory Committee on Immunization Practices (ACIP).    This lists the preventive care services that should be considered, and provides dates of when you are due. Items listed as completed are up-to-date and do not require any further intervention.    Health Maintenance   Topic Date Due   • DIABETIC EYE EXAM  1940   • INFLUENZA VACCINE  2018 (Originally 2018)   • TDAP/TD VACCINES (1 - Tdap) 2019 (Originally 1959)   • PNEUMOCOCCAL VACCINES (65+ LOW/MEDIUM RISK) (2 of 2 - PPSV23) 2018   • DIABETIC FOOT EXAM  2018   • HEMOGLOBIN A1C  2018   • MEDICARE ANNUAL WELLNESS  2019   • URINE MICROALBUMIN  2019   • ZOSTER VACCINE  Completed       Orders Placed This Encounter   Procedures   • XR Chest PA & Lateral     Standing Status:   Future     Number of Occurrences:   1     Standing Expiration Date:   2019     Order Specific Question:   Reason for Exam:     Answer:   edema cough sob   • Comprehensive Metabolic Panel   • Lipid Panel   • Vitamin D 25 Hydroxy   • Ambulatory Referral to Home Health     Referral Priority:   Routine     Referral Type:   Home Health     Referral Reason:   Specialty Services Required     Requested Specialty:   Home Health Services     Number of Visits Requested:   1   • POC Urinalysis Dipstick, Automated   • POC Glycosylated Hemoglobin (Hb A1C)   • POC Microalbumin   • CBC & Differential     Order Specific Question:   Manual  Differential     Answer:   No       Return in about 3 months (around 8/2/2018) for obtain Dr Morales last note, fasting, Next scheduled follow up.

## 2018-05-04 NOTE — TELEPHONE ENCOUNTER
Staff from Shira Marin's office called and state she would like patient seen within next week if possible.  Records in EPIC for review.

## 2018-05-06 NOTE — TELEPHONE ENCOUNTER
----- Message from Jo Hernández sent at 5/4/2018  4:09 PM EDT -----  MARK FROM [facility] CALLED AND STATED THAT THEY HAVE AN ALERT THAT PATIENT IS ON ASPIRIN AND PLAVIX AND NEED CLARIFICATION THAT IT'S OKAY TO GIVE PATIENT BOTH.     PLEASE CALL MARK AT :  [phone]    THANK YOU

## 2018-05-07 NOTE — TELEPHONE ENCOUNTER
This is been started by his cardiologist.  He has not seen his cardiologist recent but at last visit 7/24/17 this is what he had him on.

## 2018-05-14 NOTE — TELEPHONE ENCOUNTER
Call to patient to see if he would like to come in on Weds at 1:00 to our [facility].  He does not understand why he needs to be seen by us.  Advised him his PCP referred him to be seen asap.  He is going to contact Shira Marin's office for clarification and will call us back if needs to.

## 2018-06-15 NOTE — TELEPHONE ENCOUNTER
Please send please send Dr. Hernandez medication list.  I have reviewed his recent note and the patient was not on fenofibrate and Lasix dose was actually 40 mg.  Please let Dr Hernandez know this has been his current medication list for sometime.  Please send apology for any confusion regards med list.  Let the patient know that I did review his list and he needs to come in now for blood work so he can have this sent to Dr. Hernandez and see him at his scheduled appointment.  David wanted to recheck in 1 month he saw him in May.

## 2018-06-19 NOTE — TELEPHONE ENCOUNTER
Dr. Hernandez's office not yet open.  Their phone number is [phone].  Will need called to obtain fax number.

## 2018-06-19 NOTE — TELEPHONE ENCOUNTER
Fax number is [phone].    Updated Med list faxed.  Per Lisseth, most recent labs to be faxed as well.  Confirmation received.

## 2018-06-29 NOTE — TELEPHONE ENCOUNTER
Patient notified of Lisseth's message, verbal understanding given.  Patient states that he will come in for lab work.  Please place orders.

## 2018-07-13 NOTE — TELEPHONE ENCOUNTER
Patient typically does get two dispensed with each refill.  Correct refill sent into the pharmacy at this time.

## 2018-07-13 NOTE — TELEPHONE ENCOUNTER
----- Message from Florence Falcon sent at 7/12/2018  3:01 PM EDT -----  [phone]  PT NEEDED INSULIN CALLED IN AND ONLY GOT 1 AND HAS ALWAYS GOT 2 AND THEY CAN'T AFFORD TO PAY FOR 1 WHEN THEY CAN GET 2 FOR THE SAME GRAY .  TERRENCE BUSH

## 2018-08-07 ENCOUNTER — TELEPHONE (OUTPATIENT)
Dept: INTERNAL MEDICINE | Facility: CLINIC | Age: 78
End: 2018-08-07